# Patient Record
Sex: FEMALE | Race: OTHER | Employment: OTHER | URBAN - METROPOLITAN AREA
[De-identification: names, ages, dates, MRNs, and addresses within clinical notes are randomized per-mention and may not be internally consistent; named-entity substitution may affect disease eponyms.]

---

## 2022-02-04 ENCOUNTER — TRANSCRIBE ORDER (OUTPATIENT)
Dept: SCHEDULING | Age: 87
End: 2022-02-04

## 2022-02-04 DIAGNOSIS — N81.4 UTERINE PROLAPSE: Primary | ICD-10-CM

## 2022-02-11 ENCOUNTER — HOSPITAL ENCOUNTER (OUTPATIENT)
Dept: ULTRASOUND IMAGING | Age: 87
Discharge: HOME | End: 2022-02-11
Attending: FAMILY MEDICINE
Payer: MEDICARE

## 2022-02-11 DIAGNOSIS — N81.4 UTERINE PROLAPSE: ICD-10-CM

## 2022-02-11 PROCEDURE — 76856 US EXAM PELVIC COMPLETE: CPT

## 2023-10-18 ENCOUNTER — APPOINTMENT (OUTPATIENT)
Facility: HOSPITAL | Age: 88
DRG: 175 | End: 2023-10-18
Attending: STUDENT IN AN ORGANIZED HEALTH CARE EDUCATION/TRAINING PROGRAM
Payer: MEDICARE

## 2023-10-18 ENCOUNTER — APPOINTMENT (OUTPATIENT)
Facility: HOSPITAL | Age: 88
DRG: 175 | End: 2023-10-18
Payer: MEDICARE

## 2023-10-18 ENCOUNTER — HOSPITAL ENCOUNTER (INPATIENT)
Facility: HOSPITAL | Age: 88
LOS: 6 days | Discharge: HOSPICE | DRG: 175 | End: 2023-10-24
Attending: STUDENT IN AN ORGANIZED HEALTH CARE EDUCATION/TRAINING PROGRAM | Admitting: STUDENT IN AN ORGANIZED HEALTH CARE EDUCATION/TRAINING PROGRAM
Payer: MEDICARE

## 2023-10-18 DIAGNOSIS — I82.4Y2 DEEP VEIN THROMBOSIS (DVT) OF PROXIMAL VEIN OF LEFT LOWER EXTREMITY, UNSPECIFIED CHRONICITY (HCC): ICD-10-CM

## 2023-10-18 DIAGNOSIS — Z51.5 HOSPICE CARE PATIENT: ICD-10-CM

## 2023-10-18 DIAGNOSIS — N81.10 BLADDER PROLAPSE, FEMALE, ACQUIRED: Primary | ICD-10-CM

## 2023-10-18 DIAGNOSIS — I26.93 SINGLE SUBSEGMENTAL PULMONARY EMBOLISM WITHOUT ACUTE COR PULMONALE (HCC): ICD-10-CM

## 2023-10-18 PROBLEM — I26.99 PULMONARY EMBOLISM ON RIGHT (HCC): Status: ACTIVE | Noted: 2023-10-18

## 2023-10-18 LAB
ALBUMIN SERPL-MCNC: 2.7 G/DL (ref 3.4–5)
ALBUMIN/GLOB SERPL: 0.5 (ref 0.8–1.7)
ALP SERPL-CCNC: 89 U/L (ref 45–117)
ALT SERPL-CCNC: 13 U/L (ref 13–56)
AMORPH CRY URNS QL MICRO: ABNORMAL
ANION GAP SERPL CALC-SCNC: 4 MMOL/L (ref 3–18)
APPEARANCE UR: ABNORMAL
APTT PPP: 28 SEC (ref 23–36.4)
AST SERPL-CCNC: 48 U/L (ref 10–38)
BACTERIA URNS QL MICRO: ABNORMAL /HPF
BASOPHILS # BLD: 0 K/UL (ref 0–0.1)
BASOPHILS NFR BLD: 0 % (ref 0–2)
BILIRUB SERPL-MCNC: 0.7 MG/DL (ref 0.2–1)
BILIRUB UR QL: NEGATIVE
BUN SERPL-MCNC: 14 MG/DL (ref 7–18)
BUN/CREAT SERPL: 17 (ref 12–20)
CALCIUM SERPL-MCNC: 9.4 MG/DL (ref 8.5–10.1)
CHLORIDE SERPL-SCNC: 102 MMOL/L (ref 100–111)
CK SERPL-CCNC: 1239 U/L (ref 26–192)
CO2 SERPL-SCNC: 33 MMOL/L (ref 21–32)
COLOR UR: ABNORMAL
CREAT SERPL-MCNC: 0.83 MG/DL (ref 0.6–1.3)
D DIMER PPP FEU-MCNC: 12.56 UG/ML(FEU)
DIFFERENTIAL METHOD BLD: ABNORMAL
EOSINOPHIL # BLD: 0 K/UL (ref 0–0.4)
EOSINOPHIL NFR BLD: 0 % (ref 0–5)
EPITH CASTS URNS QL MICRO: ABNORMAL /LPF (ref 0–5)
ERYTHROCYTE [DISTWIDTH] IN BLOOD BY AUTOMATED COUNT: 12.5 % (ref 11.6–14.5)
ERYTHROCYTE [DISTWIDTH] IN BLOOD BY AUTOMATED COUNT: 12.6 % (ref 11.6–14.5)
ETHANOL SERPL-MCNC: <3 MG/DL (ref 0–3)
FLUAV RNA SPEC QL NAA+PROBE: NOT DETECTED
FLUBV RNA SPEC QL NAA+PROBE: NOT DETECTED
GLOBULIN SER CALC-MCNC: 5.2 G/DL (ref 2–4)
GLUCOSE SERPL-MCNC: 97 MG/DL (ref 74–99)
GLUCOSE UR STRIP.AUTO-MCNC: NEGATIVE MG/DL
HCT VFR BLD AUTO: 33.5 % (ref 35–45)
HCT VFR BLD AUTO: 37.2 % (ref 35–45)
HGB BLD-MCNC: 10.5 G/DL (ref 12–16)
HGB BLD-MCNC: 11.7 G/DL (ref 12–16)
HGB UR QL STRIP: ABNORMAL
IMM GRANULOCYTES # BLD AUTO: 0 K/UL (ref 0–0.04)
IMM GRANULOCYTES NFR BLD AUTO: 0 % (ref 0–0.5)
INR PPP: 1.1 (ref 0.9–1.1)
KETONES UR QL STRIP.AUTO: ABNORMAL MG/DL
LACTATE SERPL-SCNC: 1.4 MMOL/L (ref 0.4–2)
LACTATE SERPL-SCNC: 1.8 MMOL/L (ref 0.4–2)
LEUKOCYTE ESTERASE UR QL STRIP.AUTO: ABNORMAL
LYMPHOCYTES # BLD: 0.8 K/UL (ref 0.9–3.6)
LYMPHOCYTES NFR BLD: 10 % (ref 21–52)
MAGNESIUM SERPL-MCNC: 2.3 MG/DL (ref 1.6–2.6)
MCH RBC QN AUTO: 28.4 PG (ref 24–34)
MCH RBC QN AUTO: 28.4 PG (ref 24–34)
MCHC RBC AUTO-ENTMCNC: 31.3 G/DL (ref 31–37)
MCHC RBC AUTO-ENTMCNC: 31.5 G/DL (ref 31–37)
MCV RBC AUTO: 90.3 FL (ref 78–100)
MCV RBC AUTO: 90.5 FL (ref 78–100)
MONOCYTES # BLD: 0.7 K/UL (ref 0.05–1.2)
MONOCYTES NFR BLD: 9 % (ref 3–10)
NEUTS SEG # BLD: 6.2 K/UL (ref 1.8–8)
NEUTS SEG NFR BLD: 80 % (ref 40–73)
NITRITE UR QL STRIP.AUTO: NEGATIVE
NRBC # BLD: 0 K/UL (ref 0–0.01)
NRBC # BLD: 0 K/UL (ref 0–0.01)
NRBC BLD-RTO: 0 PER 100 WBC
NRBC BLD-RTO: 0 PER 100 WBC
PH UR STRIP: 6 (ref 5–8)
PLATELET # BLD AUTO: 267 K/UL (ref 135–420)
PLATELET # BLD AUTO: 297 K/UL (ref 135–420)
PMV BLD AUTO: 9.8 FL (ref 9.2–11.8)
PMV BLD AUTO: 9.8 FL (ref 9.2–11.8)
POTASSIUM SERPL-SCNC: 3.1 MMOL/L (ref 3.5–5.5)
PROT SERPL-MCNC: 7.9 G/DL (ref 6.4–8.2)
PROT UR STRIP-MCNC: 100 MG/DL
PROTHROMBIN TIME: 14 SEC (ref 11.9–14.7)
RBC # BLD AUTO: 3.7 M/UL (ref 4.2–5.3)
RBC # BLD AUTO: 4.12 M/UL (ref 4.2–5.3)
RBC #/AREA URNS HPF: ABNORMAL /HPF (ref 0–5)
SARS-COV-2 RNA RESP QL NAA+PROBE: NOT DETECTED
SODIUM SERPL-SCNC: 139 MMOL/L (ref 136–145)
SP GR UR REFRACTOMETRY: 1.02 (ref 1–1.03)
TROPONIN I SERPL HS-MCNC: 129 NG/L (ref 0–54)
TROPONIN I SERPL HS-MCNC: 133 NG/L (ref 0–54)
TSH SERPL DL<=0.05 MIU/L-ACNC: 0.67 UIU/ML (ref 0.36–3.74)
UROBILINOGEN UR QL STRIP.AUTO: 1 EU/DL (ref 0.2–1)
WBC # BLD AUTO: 6.6 K/UL (ref 4.6–13.2)
WBC # BLD AUTO: 7.7 K/UL (ref 4.6–13.2)
WBC URNS QL MICRO: ABNORMAL /HPF (ref 0–4)

## 2023-10-18 PROCEDURE — 2580000003 HC RX 258: Performed by: STUDENT IN AN ORGANIZED HEALTH CARE EDUCATION/TRAINING PROGRAM

## 2023-10-18 PROCEDURE — 74177 CT ABD & PELVIS W/CONTRAST: CPT

## 2023-10-18 PROCEDURE — 87150 DNA/RNA AMPLIFIED PROBE: CPT

## 2023-10-18 PROCEDURE — 83735 ASSAY OF MAGNESIUM: CPT

## 2023-10-18 PROCEDURE — 99223 1ST HOSP IP/OBS HIGH 75: CPT | Performed by: STUDENT IN AN ORGANIZED HEALTH CARE EDUCATION/TRAINING PROGRAM

## 2023-10-18 PROCEDURE — 1100000000 HC RM PRIVATE

## 2023-10-18 PROCEDURE — 71045 X-RAY EXAM CHEST 1 VIEW: CPT

## 2023-10-18 PROCEDURE — 85379 FIBRIN DEGRADATION QUANT: CPT

## 2023-10-18 PROCEDURE — 93971 EXTREMITY STUDY: CPT

## 2023-10-18 PROCEDURE — 6360000002 HC RX W HCPCS: Performed by: STUDENT IN AN ORGANIZED HEALTH CARE EDUCATION/TRAINING PROGRAM

## 2023-10-18 PROCEDURE — 82550 ASSAY OF CK (CPK): CPT

## 2023-10-18 PROCEDURE — 84443 ASSAY THYROID STIM HORMONE: CPT

## 2023-10-18 PROCEDURE — 87040 BLOOD CULTURE FOR BACTERIA: CPT

## 2023-10-18 PROCEDURE — 93005 ELECTROCARDIOGRAM TRACING: CPT | Performed by: STUDENT IN AN ORGANIZED HEALTH CARE EDUCATION/TRAINING PROGRAM

## 2023-10-18 PROCEDURE — 85025 COMPLETE CBC W/AUTO DIFF WBC: CPT

## 2023-10-18 PROCEDURE — 82077 ASSAY SPEC XCP UR&BREATH IA: CPT

## 2023-10-18 PROCEDURE — 84484 ASSAY OF TROPONIN QUANT: CPT

## 2023-10-18 PROCEDURE — 81001 URINALYSIS AUTO W/SCOPE: CPT

## 2023-10-18 PROCEDURE — 85610 PROTHROMBIN TIME: CPT

## 2023-10-18 PROCEDURE — 83605 ASSAY OF LACTIC ACID: CPT

## 2023-10-18 PROCEDURE — 6360000004 HC RX CONTRAST MEDICATION: Performed by: STUDENT IN AN ORGANIZED HEALTH CARE EDUCATION/TRAINING PROGRAM

## 2023-10-18 PROCEDURE — 6370000000 HC RX 637 (ALT 250 FOR IP): Performed by: STUDENT IN AN ORGANIZED HEALTH CARE EDUCATION/TRAINING PROGRAM

## 2023-10-18 PROCEDURE — 70450 CT HEAD/BRAIN W/O DYE: CPT

## 2023-10-18 PROCEDURE — 99285 EMERGENCY DEPT VISIT HI MDM: CPT

## 2023-10-18 PROCEDURE — 85027 COMPLETE CBC AUTOMATED: CPT

## 2023-10-18 PROCEDURE — 87636 SARSCOV2 & INF A&B AMP PRB: CPT

## 2023-10-18 PROCEDURE — 71275 CT ANGIOGRAPHY CHEST: CPT

## 2023-10-18 PROCEDURE — 85730 THROMBOPLASTIN TIME PARTIAL: CPT

## 2023-10-18 PROCEDURE — 80053 COMPREHEN METABOLIC PANEL: CPT

## 2023-10-18 RX ORDER — HEPARIN SODIUM 1000 [USP'U]/ML
40 INJECTION, SOLUTION INTRAVENOUS; SUBCUTANEOUS PRN
Status: DISCONTINUED | OUTPATIENT
Start: 2023-10-18 | End: 2023-10-21 | Stop reason: ALTCHOICE

## 2023-10-18 RX ORDER — ONDANSETRON 4 MG/1
4 TABLET, ORALLY DISINTEGRATING ORAL EVERY 8 HOURS PRN
Status: DISCONTINUED | OUTPATIENT
Start: 2023-10-18 | End: 2023-10-24 | Stop reason: HOSPADM

## 2023-10-18 RX ORDER — HEPARIN SODIUM 1000 [USP'U]/ML
80 INJECTION, SOLUTION INTRAVENOUS; SUBCUTANEOUS PRN
Status: DISCONTINUED | OUTPATIENT
Start: 2023-10-18 | End: 2023-10-21 | Stop reason: ALTCHOICE

## 2023-10-18 RX ORDER — SODIUM CHLORIDE 0.9 % (FLUSH) 0.9 %
5-40 SYRINGE (ML) INJECTION PRN
Status: DISCONTINUED | OUTPATIENT
Start: 2023-10-18 | End: 2023-10-24 | Stop reason: HOSPADM

## 2023-10-18 RX ORDER — POLYETHYLENE GLYCOL 3350 17 G/17G
17 POWDER, FOR SOLUTION ORAL DAILY PRN
Status: DISCONTINUED | OUTPATIENT
Start: 2023-10-18 | End: 2023-10-24 | Stop reason: HOSPADM

## 2023-10-18 RX ORDER — ONDANSETRON 2 MG/ML
4 INJECTION INTRAMUSCULAR; INTRAVENOUS EVERY 6 HOURS PRN
Status: DISCONTINUED | OUTPATIENT
Start: 2023-10-18 | End: 2023-10-24 | Stop reason: HOSPADM

## 2023-10-18 RX ORDER — ACETAMINOPHEN 650 MG/1
650 SUPPOSITORY RECTAL EVERY 6 HOURS PRN
Status: DISCONTINUED | OUTPATIENT
Start: 2023-10-18 | End: 2023-10-24 | Stop reason: HOSPADM

## 2023-10-18 RX ORDER — HEPARIN SODIUM 1000 [USP'U]/ML
80 INJECTION, SOLUTION INTRAVENOUS; SUBCUTANEOUS ONCE
Status: COMPLETED | OUTPATIENT
Start: 2023-10-18 | End: 2023-10-18

## 2023-10-18 RX ORDER — SODIUM CHLORIDE, SODIUM LACTATE, POTASSIUM CHLORIDE, CALCIUM CHLORIDE 600; 310; 30; 20 MG/100ML; MG/100ML; MG/100ML; MG/100ML
INJECTION, SOLUTION INTRAVENOUS CONTINUOUS
Status: DISCONTINUED | OUTPATIENT
Start: 2023-10-18 | End: 2023-10-20

## 2023-10-18 RX ORDER — SODIUM CHLORIDE 9 MG/ML
INJECTION, SOLUTION INTRAVENOUS PRN
Status: DISCONTINUED | OUTPATIENT
Start: 2023-10-18 | End: 2023-10-24 | Stop reason: HOSPADM

## 2023-10-18 RX ORDER — 0.9 % SODIUM CHLORIDE 0.9 %
500 INTRAVENOUS SOLUTION INTRAVENOUS ONCE
Status: COMPLETED | OUTPATIENT
Start: 2023-10-18 | End: 2023-10-18

## 2023-10-18 RX ORDER — SODIUM CHLORIDE 0.9 % (FLUSH) 0.9 %
5-40 SYRINGE (ML) INJECTION EVERY 12 HOURS SCHEDULED
Status: DISCONTINUED | OUTPATIENT
Start: 2023-10-18 | End: 2023-10-24 | Stop reason: HOSPADM

## 2023-10-18 RX ORDER — ACETAMINOPHEN 325 MG/1
650 TABLET ORAL EVERY 6 HOURS PRN
Status: DISCONTINUED | OUTPATIENT
Start: 2023-10-18 | End: 2023-10-24 | Stop reason: HOSPADM

## 2023-10-18 RX ORDER — POTASSIUM CHLORIDE 20 MEQ/1
40 TABLET, EXTENDED RELEASE ORAL
Status: COMPLETED | OUTPATIENT
Start: 2023-10-18 | End: 2023-10-18

## 2023-10-18 RX ORDER — HEPARIN SODIUM 10000 [USP'U]/100ML
5-30 INJECTION, SOLUTION INTRAVENOUS CONTINUOUS
Status: DISCONTINUED | OUTPATIENT
Start: 2023-10-18 | End: 2023-10-21

## 2023-10-18 RX ADMIN — HEPARIN SODIUM 2530 UNITS: 1000 INJECTION INTRAVENOUS; SUBCUTANEOUS at 20:17

## 2023-10-18 RX ADMIN — SODIUM CHLORIDE, PRESERVATIVE FREE 10 ML: 5 INJECTION INTRAVENOUS at 22:28

## 2023-10-18 RX ADMIN — SODIUM CHLORIDE 500 ML: 9 INJECTION, SOLUTION INTRAVENOUS at 15:11

## 2023-10-18 RX ADMIN — POTASSIUM CHLORIDE 40 MEQ: 1500 TABLET, EXTENDED RELEASE ORAL at 18:15

## 2023-10-18 RX ADMIN — IOPAMIDOL 80 ML: 755 INJECTION, SOLUTION INTRAVENOUS at 17:57

## 2023-10-18 RX ADMIN — SODIUM CHLORIDE, SODIUM LACTATE, POTASSIUM CHLORIDE, AND CALCIUM CHLORIDE: 600; 310; 30; 20 INJECTION, SOLUTION INTRAVENOUS at 22:40

## 2023-10-18 RX ADMIN — SODIUM CHLORIDE 500 ML: 9 INJECTION, SOLUTION INTRAVENOUS at 17:37

## 2023-10-18 RX ADMIN — WATER 1000 MG: 1 INJECTION INTRAMUSCULAR; INTRAVENOUS; SUBCUTANEOUS at 20:17

## 2023-10-18 RX ADMIN — HEPARIN SODIUM 18 UNITS/KG/HR: 10000 INJECTION, SOLUTION INTRAVENOUS at 20:18

## 2023-10-18 ASSESSMENT — PAIN - FUNCTIONAL ASSESSMENT: PAIN_FUNCTIONAL_ASSESSMENT: NONE - DENIES PAIN

## 2023-10-18 NOTE — ED NOTES
Pt back from CT scan, Medicated per [facility], awaiting further orders.       Stefanie Cee RN  10/18/23 1680

## 2023-10-18 NOTE — ED PROVIDER NOTES
unclear significance as well as potassium at 3.1 which would be replaced orally. Lactic acid, magnesium, TSH, alcohol, CBC, INR all within normal limits. D-dimer is markedly elevated at 12.56. Because this we will add on a CTA of the chest in addition to duplexes of her left upper and lower extremity. Troponin is elevated and will be trended. CPK is elevated concerning for rhabdo and she will be given fluids. Flu and COVID are negative. UA shows moderate blood with minimal red blood cells concerning for rhabdo and a white blood cell of 15-20 with some bacteria. Although it is a contaminated sample she will be treated for UTI with ceftriaxone. Also given fluids for rhabdo. CPK is elevated    Duplexes do show acute DVTs with some occlusion however has collateral flow. Patient has good pulses in her feet and no significant pain at this time therefore no emergent intervention required at this time. CT is concerning for a segmental PE and possible malignancy. CT of her head unremarkable. CT of the abdomen shows moderate atherosclerosis but no other significant acute findings at this time. Discussed findings with the patient. She really wants to go home I discussed with her why this is unsafe and therefore she will get admitted to the hospital.    Patient is admitted to the hospitalist service. Patient is in agreement with the plan to be admitted at this time. All orders moving forward replacement the admitting team.             CRITICAL CARE TIME   Total Critical Care time was 35 minutes, excluding separately reportable procedures. There was a high probability of clinically significant/life threatening deterioration in the patient's condition which required my urgent intervention. CONSULTS:  None    PROCEDURES:  Unless otherwise noted below, none     Procedures      FINAL IMPRESSION      1. Bladder prolapse, female, acquired    2.  Single subsegmental pulmonary embolism without acute cor

## 2023-10-18 NOTE — ED NOTES
Pt cleaned of stool. Noted to have a very large prolapsed uterus. Areas very dry to with necrotic area to underside. Dr. Brenden Brownlee at bedside to assess, area cleaned, NS soaked gauze over site, straight cath placed no urine noted. Secured to see if UA can be obtained. Bilateral PIV 's started blood cultures and lactic acid drawn. Covid swab sent. Pt covered with several blankets.        Marjorie Anna RN  10/18/23 6645

## 2023-10-18 NOTE — ED NOTES
Pt transported to CT scan with transport staff.  No new order noted, No heparin gtt started at this time, will clarify with MD. Pt resting comfortable on stretcher,        Brad Varghese RN  10/18/23 1498

## 2023-10-18 NOTE — ED NOTES
EKG, vascular studies and repeat labs done. MD at bedside to explain to POC. VS stable.       Kayy Toribio RN  10/18/23 0334 Per  data, pt last had oxycodone 5mg tablets refilled for a 7 day supply on 10/13/22. Medication is due for refill on 10/20/22. Pt was last seen by provider on 12/5/22. Medication routed to provider for refill approval.     WES SALCEDO RN on 1/13/2023 at 8:19 AM

## 2023-10-18 NOTE — ED TRIAGE NOTES
Assumed care of pt in rm 1. Pt here for generalized weakness and \"leaning to the left for 2 weeks. \" Pt is A O x 4, lung sounds diminished, pt is very fragile, malnourished, bones protruding, skin dry. Pt also covered in stool. Placed on full cardiac monitor.

## 2023-10-19 ENCOUNTER — ANESTHESIA EVENT (OUTPATIENT)
Dept: CARDIOTHORACIC SURGERY | Facility: HOSPITAL | Age: 88
End: 2023-10-19
Payer: MEDICARE

## 2023-10-19 ENCOUNTER — ANESTHESIA (OUTPATIENT)
Dept: CARDIOTHORACIC SURGERY | Facility: HOSPITAL | Age: 88
End: 2023-10-19
Payer: MEDICARE

## 2023-10-19 ENCOUNTER — APPOINTMENT (OUTPATIENT)
Facility: HOSPITAL | Age: 88
DRG: 175 | End: 2023-10-19
Payer: MEDICARE

## 2023-10-19 PROBLEM — E43 SEVERE MALNUTRITION (HCC): Chronic | Status: ACTIVE | Noted: 2023-10-19

## 2023-10-19 LAB
ACCESSION NUMBER, LLC1M: NORMAL
ACINETOBACTER CALCOAC BAUMANNII COMPLEX BY PCR: NOT DETECTED
ANION GAP SERPL CALC-SCNC: 5 MMOL/L (ref 3–18)
APTT PPP: 100.3 SEC (ref 23–36.4)
APTT PPP: 52 SEC (ref 23–36.4)
B FRAGILIS DNA BLD POS QL NAA+NON-PROBE: NOT DETECTED
BASOPHILS # BLD: 0 K/UL (ref 0–0.1)
BASOPHILS NFR BLD: 0 % (ref 0–2)
BIOFIRE TEST COMMENT: NORMAL
BUN SERPL-MCNC: 11 MG/DL (ref 7–18)
BUN/CREAT SERPL: 19 (ref 12–20)
C ALBICANS DNA BLD POS QL NAA+NON-PROBE: NOT DETECTED
C AURIS DNA BLD POS QL NAA+NON-PROBE: NOT DETECTED
C GATTII+NEOFOR DNA BLD POS QL NAA+N-PRB: NOT DETECTED
C GLABRATA DNA BLD POS QL NAA+NON-PROBE: NOT DETECTED
C KRUSEI DNA BLD POS QL NAA+NON-PROBE: NOT DETECTED
C PARAP DNA BLD POS QL NAA+NON-PROBE: NOT DETECTED
C TROPICLS DNA BLD POS QL NAA+NON-PROBE: NOT DETECTED
CALCIUM SERPL-MCNC: 8.7 MG/DL (ref 8.5–10.1)
CHLORIDE SERPL-SCNC: 107 MMOL/L (ref 100–111)
CO2 SERPL-SCNC: 29 MMOL/L (ref 21–32)
CREAT SERPL-MCNC: 0.58 MG/DL (ref 0.6–1.3)
DIFFERENTIAL METHOD BLD: ABNORMAL
E CLOAC COMP DNA BLD POS NAA+NON-PROBE: NOT DETECTED
E COLI DNA BLD POS QL NAA+NON-PROBE: NOT DETECTED
E FAECALIS DNA BLD POS QL NAA+NON-PROBE: NOT DETECTED
E FAECIUM DNA BLD POS QL NAA+NON-PROBE: NOT DETECTED
ECHO BSA: 1.19 M2
ECHO BSA: 1.19 M2
EKG ATRIAL RATE: 288 BPM
EKG DIAGNOSIS: NORMAL
EKG P AXIS: 63 DEGREES
EKG P-R INTERVAL: 154 MS
EKG Q-T INTERVAL: 468 MS
EKG QRS DURATION: 84 MS
EKG QTC CALCULATION (BAZETT): 475 MS
EKG R AXIS: 51 DEGREES
EKG T AXIS: 242 DEGREES
EKG VENTRICULAR RATE: 62 BPM
ENTEROBACTERALES DNA BLD POS NAA+N-PRB: NOT DETECTED
EOSINOPHIL # BLD: 0 K/UL (ref 0–0.4)
EOSINOPHIL NFR BLD: 0 % (ref 0–5)
ERYTHROCYTE [DISTWIDTH] IN BLOOD BY AUTOMATED COUNT: 12.6 % (ref 11.6–14.5)
GLUCOSE SERPL-MCNC: 79 MG/DL (ref 74–99)
GP B STREP DNA BLD POS QL NAA+NON-PROBE: NOT DETECTED
HAEM INFLU DNA BLD POS QL NAA+NON-PROBE: NOT DETECTED
HCT VFR BLD AUTO: 32.5 % (ref 35–45)
HGB BLD-MCNC: 10 G/DL (ref 12–16)
IMM GRANULOCYTES # BLD AUTO: 0 K/UL (ref 0–0.04)
IMM GRANULOCYTES NFR BLD AUTO: 0 % (ref 0–0.5)
K OXYTOCA DNA BLD POS QL NAA+NON-PROBE: NOT DETECTED
KLEBSIELLA SP DNA BLD POS QL NAA+NON-PRB: NOT DETECTED
KLEBSIELLA SP DNA BLD POS QL NAA+NON-PRB: NOT DETECTED
L MONOCYTOG DNA BLD POS QL NAA+NON-PROBE: NOT DETECTED
LYMPHOCYTES # BLD: 0.9 K/UL (ref 0.9–3.6)
LYMPHOCYTES NFR BLD: 17 % (ref 21–52)
MAGNESIUM SERPL-MCNC: 2.1 MG/DL (ref 1.6–2.6)
MCH RBC QN AUTO: 27.9 PG (ref 24–34)
MCHC RBC AUTO-ENTMCNC: 30.8 G/DL (ref 31–37)
MCV RBC AUTO: 90.5 FL (ref 78–100)
MONOCYTES # BLD: 0.5 K/UL (ref 0.05–1.2)
MONOCYTES NFR BLD: 9 % (ref 3–10)
N MEN DNA BLD POS QL NAA+NON-PROBE: NOT DETECTED
NEUTS SEG # BLD: 3.8 K/UL (ref 1.8–8)
NEUTS SEG NFR BLD: 73 % (ref 40–73)
NRBC # BLD: 0 K/UL (ref 0–0.01)
NRBC BLD-RTO: 0 PER 100 WBC
P AERUGINOSA DNA BLD POS NAA+NON-PROBE: NOT DETECTED
PLATELET # BLD AUTO: 276 K/UL (ref 135–420)
PMV BLD AUTO: 9.9 FL (ref 9.2–11.8)
POTASSIUM SERPL-SCNC: 3.2 MMOL/L (ref 3.5–5.5)
PROTEUS SP DNA BLD POS QL NAA+NON-PROBE: NOT DETECTED
RBC # BLD AUTO: 3.59 M/UL (ref 4.2–5.3)
RESISTANT GENE TARGETS: NORMAL
S AUREUS DNA BLD POS QL NAA+NON-PROBE: NOT DETECTED
S AUREUS+CONS DNA BLD POS NAA+NON-PROBE: NOT DETECTED
S EPIDERMIDIS DNA BLD POS QL NAA+NON-PRB: NOT DETECTED
S LUGDUNENSIS DNA BLD POS QL NAA+NON-PRB: NOT DETECTED
S MALTOPHILIA DNA BLD POS QL NAA+NON-PRB: NOT DETECTED
S MARCESCENS DNA BLD POS NAA+NON-PROBE: NOT DETECTED
S PNEUM DNA BLD POS QL NAA+NON-PROBE: NOT DETECTED
S PYO DNA BLD POS QL NAA+NON-PROBE: NOT DETECTED
SALMONELLA DNA BLD POS QL NAA+NON-PROBE: NOT DETECTED
SODIUM SERPL-SCNC: 141 MMOL/L (ref 136–145)
STREPTOCOCCUS DNA BLD POS NAA+NON-PROBE: NOT DETECTED
TROPONIN I SERPL HS-MCNC: 149 NG/L (ref 0–54)
WBC # BLD AUTO: 5.2 K/UL (ref 4.6–13.2)

## 2023-10-19 PROCEDURE — 1100000003 HC PRIVATE W/ TELEMETRY

## 2023-10-19 PROCEDURE — 6360000002 HC RX W HCPCS: Performed by: STUDENT IN AN ORGANIZED HEALTH CARE EDUCATION/TRAINING PROGRAM

## 2023-10-19 PROCEDURE — 93971 EXTREMITY STUDY: CPT | Performed by: SURGERY

## 2023-10-19 PROCEDURE — 3700000000 HC ANESTHESIA ATTENDED CARE: Performed by: SURGERY

## 2023-10-19 PROCEDURE — 99232 SBSQ HOSP IP/OBS MODERATE 35: CPT | Performed by: STUDENT IN AN ORGANIZED HEALTH CARE EDUCATION/TRAINING PROGRAM

## 2023-10-19 PROCEDURE — 6360000004 HC RX CONTRAST MEDICATION: Performed by: SURGERY

## 2023-10-19 PROCEDURE — 2580000003 HC RX 258: Performed by: STUDENT IN AN ORGANIZED HEALTH CARE EDUCATION/TRAINING PROGRAM

## 2023-10-19 PROCEDURE — C1769 GUIDE WIRE: HCPCS | Performed by: SURGERY

## 2023-10-19 PROCEDURE — 93010 ELECTROCARDIOGRAM REPORT: CPT | Performed by: INTERNAL MEDICINE

## 2023-10-19 PROCEDURE — 85730 THROMBOPLASTIN TIME PARTIAL: CPT

## 2023-10-19 PROCEDURE — 80048 BASIC METABOLIC PNL TOTAL CA: CPT

## 2023-10-19 PROCEDURE — 84484 ASSAY OF TROPONIN QUANT: CPT

## 2023-10-19 PROCEDURE — 6370000000 HC RX 637 (ALT 250 FOR IP): Performed by: STUDENT IN AN ORGANIZED HEALTH CARE EDUCATION/TRAINING PROGRAM

## 2023-10-19 PROCEDURE — C1894 INTRO/SHEATH, NON-LASER: HCPCS | Performed by: SURGERY

## 2023-10-19 PROCEDURE — 06H03DZ INSERTION OF INTRALUMINAL DEVICE INTO INFERIOR VENA CAVA, PERCUTANEOUS APPROACH: ICD-10-PCS | Performed by: SURGERY

## 2023-10-19 PROCEDURE — 3700000001 HC ADD 15 MINUTES (ANESTHESIA): Performed by: SURGERY

## 2023-10-19 PROCEDURE — B5191ZZ FLUOROSCOPY OF INFERIOR VENA CAVA USING LOW OSMOLAR CONTRAST: ICD-10-PCS | Performed by: SURGERY

## 2023-10-19 PROCEDURE — C1880 VENA CAVA FILTER: HCPCS | Performed by: SURGERY

## 2023-10-19 PROCEDURE — 3600000002 HC SURGERY LEVEL 2 BASE: Performed by: SURGERY

## 2023-10-19 PROCEDURE — 3600000012 HC SURGERY LEVEL 2 ADDTL 15MIN: Performed by: SURGERY

## 2023-10-19 PROCEDURE — 85025 COMPLETE CBC W/AUTO DIFF WBC: CPT

## 2023-10-19 PROCEDURE — 83735 ASSAY OF MAGNESIUM: CPT

## 2023-10-19 PROCEDURE — 2500000003 HC RX 250 WO HCPCS: Performed by: SURGERY

## 2023-10-19 PROCEDURE — 36415 COLL VENOUS BLD VENIPUNCTURE: CPT

## 2023-10-19 PROCEDURE — 2709999900 HC NON-CHARGEABLE SUPPLY: Performed by: SURGERY

## 2023-10-19 PROCEDURE — 94761 N-INVAS EAR/PLS OXIMETRY MLT: CPT

## 2023-10-19 DEVICE — FILTER VASC L49MM DIA30MM INTRO 7FR L65CM CATH L79CM VENA: Type: IMPLANTABLE DEVICE | Status: FUNCTIONAL

## 2023-10-19 RX ORDER — LIDOCAINE HYDROCHLORIDE 10 MG/ML
INJECTION, SOLUTION EPIDURAL; INFILTRATION; INTRACAUDAL; PERINEURAL PRN
Status: DISCONTINUED | OUTPATIENT
Start: 2023-10-19 | End: 2023-10-19 | Stop reason: ALTCHOICE

## 2023-10-19 RX ORDER — IODIXANOL 320 MG/ML
INJECTION, SOLUTION INTRAVASCULAR PRN
Status: DISCONTINUED | OUTPATIENT
Start: 2023-10-19 | End: 2023-10-19 | Stop reason: ALTCHOICE

## 2023-10-19 RX ORDER — HEPARIN SODIUM 200 [USP'U]/100ML
INJECTION, SOLUTION INTRAVENOUS
Status: DISPENSED
Start: 2023-10-19 | End: 2023-10-20

## 2023-10-19 RX ORDER — POTASSIUM CHLORIDE 20 MEQ/1
40 TABLET, EXTENDED RELEASE ORAL 2 TIMES DAILY WITH MEALS
Status: DISCONTINUED | OUTPATIENT
Start: 2023-10-19 | End: 2023-10-19

## 2023-10-19 RX ORDER — GAUZE BANDAGE 2" X 2"
100 BANDAGE TOPICAL DAILY
Status: DISCONTINUED | OUTPATIENT
Start: 2023-10-19 | End: 2023-10-24 | Stop reason: HOSPADM

## 2023-10-19 RX ORDER — POTASSIUM CHLORIDE 7.45 MG/ML
10 INJECTION INTRAVENOUS
Status: COMPLETED | OUTPATIENT
Start: 2023-10-19 | End: 2023-10-19

## 2023-10-19 RX ORDER — MULTIVITAMIN WITH IRON
1 TABLET ORAL DAILY
Status: DISCONTINUED | OUTPATIENT
Start: 2023-10-19 | End: 2023-10-24 | Stop reason: HOSPADM

## 2023-10-19 RX ORDER — IODIXANOL 320 MG/ML
INJECTION, SOLUTION INTRAVASCULAR
Status: DISPENSED
Start: 2023-10-19 | End: 2023-10-20

## 2023-10-19 RX ORDER — PETROLATUM,WHITE/LANOLIN
OINTMENT (GRAM) TOPICAL 2 TIMES DAILY
Status: DISCONTINUED | OUTPATIENT
Start: 2023-10-19 | End: 2023-10-24 | Stop reason: HOSPADM

## 2023-10-19 RX ADMIN — Medication: at 21:05

## 2023-10-19 RX ADMIN — POTASSIUM BICARBONATE 25 MEQ: 978 TABLET, EFFERVESCENT ORAL at 21:04

## 2023-10-19 RX ADMIN — SODIUM CHLORIDE: 9 INJECTION, SOLUTION INTRAVENOUS at 15:31

## 2023-10-19 RX ADMIN — POTASSIUM CHLORIDE 10 MEQ: 7.45 INJECTION INTRAVENOUS at 04:45

## 2023-10-19 RX ADMIN — SODIUM CHLORIDE, PRESERVATIVE FREE 10 ML: 5 INJECTION INTRAVENOUS at 21:05

## 2023-10-19 RX ADMIN — WATER 1000 MG: 1 INJECTION INTRAMUSCULAR; INTRAVENOUS; SUBCUTANEOUS at 17:33

## 2023-10-19 RX ADMIN — POTASSIUM BICARBONATE 25 MEQ: 978 TABLET, EFFERVESCENT ORAL at 17:30

## 2023-10-19 RX ADMIN — Medication: at 13:54

## 2023-10-19 RX ADMIN — POTASSIUM CHLORIDE 10 MEQ: 7.45 INJECTION INTRAVENOUS at 08:00

## 2023-10-19 RX ADMIN — POTASSIUM CHLORIDE 10 MEQ: 7.45 INJECTION INTRAVENOUS at 05:51

## 2023-10-19 RX ADMIN — SODIUM CHLORIDE, PRESERVATIVE FREE 10 ML: 5 INJECTION INTRAVENOUS at 08:26

## 2023-10-19 RX ADMIN — HEPARIN SODIUM 2530 UNITS: 1000 INJECTION INTRAVENOUS; SUBCUTANEOUS at 03:50

## 2023-10-19 RX ADMIN — Medication 100 MG: at 12:53

## 2023-10-19 RX ADMIN — THERA TABS 1 TABLET: TAB at 12:53

## 2023-10-19 ASSESSMENT — ENCOUNTER SYMPTOMS: SHORTNESS OF BREATH: 1

## 2023-10-19 NOTE — CONSULTS
Comprehensive Nutrition Assessment    Type and Reason for Visit:  Initial, Positive Nutrition Screen, Consult    Nutrition Recommendations/Plan:   Continue Current Diet. Modify Oral Nutrition Supplements- Plan to add Marco (each provides 95 kcal, 2.5g protein) BID to assist with wound healing, Continue Ensure TID. Plan to order Multivitamin and Thiamine d/t Malnutrition. Continue to monitor tolerance of PO, compliance of oral supplements, weight, labs, and plan of care during admission. Malnutrition Assessment:  Malnutrition Status:  Severe malnutrition (10/19/23 1112)    Context:  Chronic Illness     Findings of the 6 clinical characteristics of malnutrition:  Energy Intake:  75% or less estimated energy requirements for 1 month or longer  Weight Loss:  No significant weight loss (Limited weight hx.)     Body Fat Loss:  Severe body fat loss Orbital, Buccal region   Muscle Mass Loss:  Severe muscle mass loss Temples (temporalis)  Fluid Accumulation:  No significant fluid accumulation     Strength:  Not Performed    Nutrition History and Allergies: PMHx: Uterine prolapse, cachexia, recent falls. Hx obtained from pt's daughter. Pt with poor appetite, consuming <50% of meals x 1 month d/t early satiety, prior to that time period pt with good appetite. Pt UBW ~110 lbs x 1 year and weight loss. Wt hx: 94 lbs (10/19/23) obtained by RD. Limited weight hx. Based on reported UBW, weight loss of 16 lbs (14.5%) x 1 year, non-significant weight loss. Will continue to monitor weight trends. NKFA per chart review and pt's daughter. Nutrition Assessment:    Pt presents with complaints of fatigue, swelling of left arm and leg. Pt admitted for pulmonary embolism on right. Positive nutrition screen noted, presure injury. Consult noted for wounds. Note, pt BMI 16. Attempted visit, pt sleeping did not wake to verbal stimuli. Per pt's daughter, pt consumed breakfast today. No PO documented in flowsheets.  Continue

## 2023-10-19 NOTE — CONSULTS
Room 2315: Focused wound assess    Left medial heel, black callous tissue, silicone heel dressing in place,   No drainage or odor, periwound tissue intact, POA. Right heel has no pressure injuries, flaky skin, silicone dressing in use, POA. Anterior prolapse of bladder, white yellow covering of tissue on mucosa,   (+) painful to touch, POA. Posterior aspect of prolapsed bladder, yellow tissue covering mucosa along with  Bright red tissue, small amount brown black tissue noted on posterior aspect,  (+) painful to touch, POA. Sacrum, stage 2 pressure injury, red base, white tissue on edges, 1x1x0.1cm,   Drainage scant serosanguinous  No wound Odor  Edges are defined & attached. Periwound intact. Dressing silicone heart in place. POA. Left hip, stage 2 pressure injury, 1x0.5x0.1cm, red base tissue, edges defined  & attached, scant serosanguinous drainage, no wound odor,   Periwound intact. Dressing silicone. POA. Right hip intact. Pericare provided for smear of light brown stool. Booster pad changed. Bed pad changed. Wound Care Orders Sacrum, heels, & left hip: Unit staff nurses to cleanse wound with wound spray from par room, then apply Optifoam Gentle Silicone dressing, change every 2 days & prn soilage. Wound care orders prolapsed bladder: Unit nursing staff to apply Vitamin A&D oint BID & prn soilage. Cover with Booster pad from par room. Heel prevention boots & turning orders placed. Request RD to evaluate. Will turn over care to unit nursing staff at this time & sign off.    Neo MCKEE, RN, [facility] [employer], [address] [place] [address]

## 2023-10-19 NOTE — ANESTHESIA PRE PROCEDURE
01:41 AM    RDW 12.6 10/19/2023 01:41 AM     10/19/2023 01:41 AM       CMP:   Lab Results   Component Value Date/Time     10/19/2023 01:41 AM    K 3.2 10/19/2023 01:41 AM     10/19/2023 01:41 AM    CO2 29 10/19/2023 01:41 AM    BUN 11 10/19/2023 01:41 AM    CREATININE 0.58 10/19/2023 01:41 AM    LABGLOM >60 10/19/2023 01:41 AM    GLUCOSE 79 10/19/2023 01:41 AM    PROT 7.9 10/18/2023 03:00 PM    CALCIUM 8.7 10/19/2023 01:41 AM    BILITOT 0.7 10/18/2023 03:00 PM    ALKPHOS 89 10/18/2023 03:00 PM    AST 48 10/18/2023 03:00 PM    ALT 13 10/18/2023 03:00 PM       POC Tests: No results for input(s): \"POCGLU\", \"POCNA\", \"POCK\", \"POCCL\", \"POCBUN\", \"POCHEMO\", \"POCHCT\" in the last 72 hours. Coags:   Lab Results   Component Value Date/Time    PROTIME 14.0 10/18/2023 03:00 PM    INR 1.1 10/18/2023 03:00 PM    APTT 100.3 10/19/2023 10:24 AM       HCG (If Applicable): No results found for: \"PREGTESTUR\", \"PREGSERUM\", \"HCG\", \"HCGQUANT\"     ABGs: No results found for: \"PHART\", \"PO2ART\", \"AQU6LQP\", \"TFP4YKM\", \"BEART\", \"I0FOPBNU\"     Type & Screen (If Applicable):  No results found for: \"LABABO\", \"LABRH\"    Drug/Infectious Status (If Applicable):  No results found for: \"HIV\", \"HEPCAB\"    COVID-19 Screening (If Applicable):   Lab Results   Component Value Date/Time    COVID19 Not detected 10/18/2023 03:00 PM           Anesthesia Evaluation  Patient summary reviewed no history of anesthetic complications:   Airway: Mallampati: Unable to assess / NA       Comment: Not able to assess     Dental:    (+) upper dentures and lower dentures      Pulmonary:   (+) shortness of breath:  decreased breath sounds                           ROS comment: PE   Cardiovascular:    (+) past MI:,         Rhythm: regular  Rate: abnormal                    Neuro/Psych:               GI/Hepatic/Renal:             Endo/Other:                      ROS comment: Rhabdomyolysis Abdominal:             Vascular:   + DVT, PE.        Other Findings:

## 2023-10-19 NOTE — OR NURSING
TRANSFER - OUT REPORT:    Verbal report given to Raina Sams  on Yunier Gutierrez  @16:07 being transferred to 2315 CVT step down  for routine post-op       Report consisted of patient's Situation, Background, Assessment and   Recommendations(SBAR). Information from the following report(s) Nurse Handoff Report and Procedure Verification was reviewed with the receiving nurse. Lines:   Peripheral IV 10/18/23 Left; Anterior Cephalic (Active)   Site Assessment Clean, dry & intact 10/19/23 0349   Line Status Infusing 10/19/23 0349   Line Care Connections checked and tightened 10/19/23 0349   Phlebitis Assessment No symptoms 10/19/23 0349   Infiltration Assessment 0 10/19/23 0349   Alcohol Cap Used Yes 10/19/23 0349   Dressing Status Clean, dry & intact 10/19/23 0349   Dressing Type Transparent 10/19/23 0349       Peripheral IV 10/18/23 Posterior;Right Forearm (Active)   Site Assessment Clean, dry & intact 10/19/23 0349   Line Status Infusing 10/19/23 [address] Connections checked and tightened 10/19/23 0349   Phlebitis Assessment No symptoms 10/19/23 0349   Infiltration Assessment 0 10/19/23 0349   Alcohol Cap Used Yes 10/19/23 0349   Dressing Status Clean, dry & intact 10/19/23 0349   Dressing Type Transparent 10/19/23 0349        Opportunity for questions and clarification was provided.       Patient transported with:  Monitor, O2 @ 2 lpm, and Registered Nurse and Anesthesiologist

## 2023-10-19 NOTE — CONSULTS
Kasie Wang    Chief Complaint   Patient presents with    Fatigue       History and Physical    40-year-old female brought to the hospital after having several falls the family noted swelling on the left arm and the left leg. In her work-up in the emergency room she was found to have significant DVT left lower extremity possibly left upper extremity and a CT chest showed pulmonary embolism. Patient is unable to provide her own history. Discussed with her daughter on the phone. No past medical history on file. Patient Active Problem List   Diagnosis    Pulmonary embolism on right [facility])     No past surgical history on file.   Current Facility-Administered Medications   Medication Dose Route Frequency Provider Last Rate Last Admin    vitamin A & D ointment   Topical BID Kristie Zhou,         thiamine mononitrate tablet 100 mg  100 mg Oral Daily Kristie Zhou, DO        multivitamin 1 tablet  1 tablet Oral Daily Kristie Zhou, DO        heparin (porcine) injection 2,530 Units  80 Units/kg IntraVENous PRN Abner Pastor MD   2,530 Units at 10/19/23 0350    heparin (porcine) injection 1,260 Units  40 Units/kg IntraVENous PRN Abner Pastor MD        [facility] [place] by provider] heparin 25,000 units in dextrose 5% 250 mL (premix) infusion  5-30 Units/kg/hr IntraVENous Continuous Abner Pastor MD 7 mL/hr at 10/19/23 0349 22 Units/kg/hr at 10/19/23 0349    sodium chloride flush 0.9 % injection 5-40 mL  5-40 mL IntraVENous 2 times per day Abner Pastor MD   10 mL at 10/19/23 0826    sodium chloride flush 0.9 % injection 5-40 mL  5-40 mL IntraVENous PRN Abner Pastor MD        0.9 % sodium chloride infusion   IntraVENous PRN Abner Pastor MD        ondansetron (ZOFRAN-ODT) disintegrating tablet 4 mg  4 mg Oral Q8H PRN Abner Pastor MD        Or    [facility]) injection 4 mg  4 mg IntraVENous Q6H PRN Abner Pastor MD        polyethylene glycol (GLYCOLAX) packet 17 g  17 g Oral Daily

## 2023-10-19 NOTE — OP NOTE
Operative Note      Patient: Gely Garcia  YOB: 1932  MRN: [id number]    Date of Procedure: 10/19/2023    Pre-Op Diagnosis Codes:     * Deep vein thrombosis (DVT) of proximal vein of left lower extremity, unspecified chronicity ([address]) [I82.4Y2]    Post-Op Diagnosis:  DVT left lower extremity, pulmonary embolism, unable to fully anticoagulate secondary to bleed       Procedures: Ultrasound of right femoral vein with interpretation  Inferior venacavogram with interpretation  Insertion of inferior vena cava filter    Surgeon(s):  Juwan Caruso MD    Assistant:   Surgical Assistant: Dixie Leyva    Anesthesia: Local    Estimated Blood Loss (mL): Minimal    Complications: None    Specimens:   * No specimens in log *    Implants:  * No implants in log *      Drains: * No LDAs found *    Findings: The right iliac vein is patent and free of thrombus. The inferior vena cava is tortuous and follows the path of her scoliosis. The inferior vena cava is patent and there is no thrombus present the renal veins are well visualized. Inferior vena cava between the renals and the bifurcation is 25 mm greatest diameter. Successful deployment of a Cook select platinum filter above the bifurcation below the renal veins with no migration is full deployment. Detailed Description of Procedure:   Right groin was prepped draped usual standard fashion followed by CC, guidelines trace of technique 1% lidocaine was used for local.  Ultrasound was done of the right femoral vein. It is free of thrombus and compressible. Under direct visualization a puncture to the 12:00 and placed a wire and then a 8 [place] sheath. Performed a venogram for findings as above. Placed the introduction of sheath into the inferior vena cava and deployed the Thresa Major select platinum filter in the appropriate position.   A picture was stored and the filter was fully deployed there is no migration of pulled the sheath and held direct pressure

## 2023-10-19 NOTE — SIGNIFICANT EVENT
RN notified of intermittent sinus bradycardia (HR in 30s) - patient asymptomatic and HD stable. Review of telemetry confirms sinus jesse. No reason for pacing pads or PRN atropine. Will CTM and obtain ECG if sustained for long enough to obtain.     Ed Jauregui MD  1:06 AM

## 2023-10-20 PROBLEM — N81.10 BLADDER PROLAPSE, FEMALE, ACQUIRED: Status: ACTIVE | Noted: 2023-10-20

## 2023-10-20 PROBLEM — R54 ADVANCED AGE: Status: ACTIVE | Noted: 2023-10-20

## 2023-10-20 PROBLEM — Z51.5 ENCOUNTER FOR PALLIATIVE CARE: Status: ACTIVE | Noted: 2023-10-20

## 2023-10-20 PROBLEM — I26.93 SINGLE SUBSEGMENTAL PULMONARY EMBOLISM WITHOUT ACUTE COR PULMONALE (HCC): Status: ACTIVE | Noted: 2023-10-20

## 2023-10-20 PROBLEM — R64 CACHEXIA (HCC): Status: ACTIVE | Noted: 2023-10-20
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
## 2023-10-20 PROBLEM — I82.4Y2 DEEP VEIN THROMBOSIS (DVT) OF PROXIMAL VEIN OF LEFT LOWER EXTREMITY (HCC): Status: ACTIVE | Noted: 2023-10-20

## 2023-10-20 PROBLEM — M62.82 NON-TRAUMATIC RHABDOMYOLYSIS: Status: ACTIVE | Noted: 2023-10-20

## 2023-10-20 PROBLEM — Z71.89 GOALS OF CARE, COUNSELING/DISCUSSION: Status: ACTIVE | Noted: 2023-10-20

## 2023-10-20 LAB
ANION GAP SERPL CALC-SCNC: 1 MMOL/L (ref 3–18)
APTT PPP: 108.2 SEC (ref 23–36.4)
APTT PPP: 44.7 SEC (ref 23–36.4)
APTT PPP: 81.2 SEC (ref 23–36.4)
BASOPHILS # BLD: 0 K/UL (ref 0–0.1)
BASOPHILS NFR BLD: 1 % (ref 0–2)
BUN SERPL-MCNC: 8 MG/DL (ref 7–18)
BUN/CREAT SERPL: 16 (ref 12–20)
CALCIUM SERPL-MCNC: 8.4 MG/DL (ref 8.5–10.1)
CHLORIDE SERPL-SCNC: 106 MMOL/L (ref 100–111)
CK SERPL-CCNC: 398 U/L (ref 26–192)
CO2 SERPL-SCNC: 31 MMOL/L (ref 21–32)
CREAT SERPL-MCNC: 0.49 MG/DL (ref 0.6–1.3)
DIFFERENTIAL METHOD BLD: ABNORMAL
EOSINOPHIL # BLD: 0.1 K/UL (ref 0–0.4)
EOSINOPHIL NFR BLD: 3 % (ref 0–5)
ERYTHROCYTE [DISTWIDTH] IN BLOOD BY AUTOMATED COUNT: 12.8 % (ref 11.6–14.5)
GLUCOSE SERPL-MCNC: 78 MG/DL (ref 74–99)
HCT VFR BLD AUTO: 32 % (ref 35–45)
HGB BLD-MCNC: 10 G/DL (ref 12–16)
IMM GRANULOCYTES # BLD AUTO: 0 K/UL (ref 0–0.04)
IMM GRANULOCYTES NFR BLD AUTO: 0 % (ref 0–0.5)
LYMPHOCYTES # BLD: 1.2 K/UL (ref 0.9–3.6)
LYMPHOCYTES NFR BLD: 27 % (ref 21–52)
MCH RBC QN AUTO: 28.6 PG (ref 24–34)
MCHC RBC AUTO-ENTMCNC: 31.3 G/DL (ref 31–37)
MCV RBC AUTO: 91.4 FL (ref 78–100)
MONOCYTES # BLD: 0.4 K/UL (ref 0.05–1.2)
MONOCYTES NFR BLD: 9 % (ref 3–10)
NEUTS SEG # BLD: 2.6 K/UL (ref 1.8–8)
NEUTS SEG NFR BLD: 60 % (ref 40–73)
NRBC # BLD: 0 K/UL (ref 0–0.01)
NRBC BLD-RTO: 0 PER 100 WBC
PLATELET # BLD AUTO: 266 K/UL (ref 135–420)
PMV BLD AUTO: 9.8 FL (ref 9.2–11.8)
POTASSIUM SERPL-SCNC: 4.6 MMOL/L (ref 3.5–5.5)
RBC # BLD AUTO: 3.5 M/UL (ref 4.2–5.3)
SODIUM SERPL-SCNC: 138 MMOL/L (ref 136–145)
WBC # BLD AUTO: 4.3 K/UL (ref 4.6–13.2)

## 2023-10-20 PROCEDURE — 6360000002 HC RX W HCPCS: Performed by: STUDENT IN AN ORGANIZED HEALTH CARE EDUCATION/TRAINING PROGRAM

## 2023-10-20 PROCEDURE — 97535 SELF CARE MNGMENT TRAINING: CPT

## 2023-10-20 PROCEDURE — 1100000003 HC PRIVATE W/ TELEMETRY

## 2023-10-20 PROCEDURE — 99222 1ST HOSP IP/OBS MODERATE 55: CPT | Performed by: NURSE PRACTITIONER

## 2023-10-20 PROCEDURE — 97165 OT EVAL LOW COMPLEX 30 MIN: CPT

## 2023-10-20 PROCEDURE — 94761 N-INVAS EAR/PLS OXIMETRY MLT: CPT

## 2023-10-20 PROCEDURE — 85730 THROMBOPLASTIN TIME PARTIAL: CPT

## 2023-10-20 PROCEDURE — 97162 PT EVAL MOD COMPLEX 30 MIN: CPT

## 2023-10-20 PROCEDURE — 82550 ASSAY OF CK (CPK): CPT

## 2023-10-20 PROCEDURE — 85025 COMPLETE CBC W/AUTO DIFF WBC: CPT

## 2023-10-20 PROCEDURE — 87040 BLOOD CULTURE FOR BACTERIA: CPT

## 2023-10-20 PROCEDURE — 99232 SBSQ HOSP IP/OBS MODERATE 35: CPT | Performed by: HOSPITALIST

## 2023-10-20 PROCEDURE — 97530 THERAPEUTIC ACTIVITIES: CPT

## 2023-10-20 PROCEDURE — 6370000000 HC RX 637 (ALT 250 FOR IP): Performed by: STUDENT IN AN ORGANIZED HEALTH CARE EDUCATION/TRAINING PROGRAM

## 2023-10-20 PROCEDURE — 80048 BASIC METABOLIC PNL TOTAL CA: CPT

## 2023-10-20 PROCEDURE — 2580000003 HC RX 258: Performed by: STUDENT IN AN ORGANIZED HEALTH CARE EDUCATION/TRAINING PROGRAM

## 2023-10-20 RX ADMIN — WATER 1000 MG: 1 INJECTION INTRAMUSCULAR; INTRAVENOUS; SUBCUTANEOUS at 17:26

## 2023-10-20 RX ADMIN — HEPARIN SODIUM 22 UNITS/KG/HR: 10000 INJECTION, SOLUTION INTRAVENOUS at 16:02

## 2023-10-20 RX ADMIN — HEPARIN SODIUM 22 UNITS/KG/HR: 10000 INJECTION, SOLUTION INTRAVENOUS at 11:04

## 2023-10-20 RX ADMIN — SODIUM CHLORIDE, PRESERVATIVE FREE 10 ML: 5 INJECTION INTRAVENOUS at 21:06

## 2023-10-20 RX ADMIN — Medication 100 MG: at 10:58

## 2023-10-20 RX ADMIN — Medication: at 10:58

## 2023-10-20 RX ADMIN — THERA TABS 1 TABLET: TAB at 10:58

## 2023-10-20 RX ADMIN — SODIUM CHLORIDE, PRESERVATIVE FREE 5 ML: 5 INJECTION INTRAVENOUS at 10:58

## 2023-10-20 RX ADMIN — SODIUM CHLORIDE, SODIUM LACTATE, POTASSIUM CHLORIDE, AND CALCIUM CHLORIDE: 600; 310; 30; 20 INJECTION, SOLUTION INTRAVENOUS at 13:06

## 2023-10-20 RX ADMIN — Medication: at 21:06

## 2023-10-20 NOTE — PLAN OF CARE
Problem: Discharge Planning  Goal: Discharge to home or other facility with appropriate resources  Outcome: Progressing     Problem: Safety - Adult  Goal: Free from fall injury  Outcome: Progressing     Problem: Nutrition Deficit:  Goal: Optimize nutritional status  Outcome: Progressing  Flowsheets (Taken 10/19/2023 1123 by Chilo June RD)  Nutrient intake appropriate for improving, restoring, or maintaining nutritional needs: Monitor oral intake, labs, and treatment plans     Problem: Skin/Tissue Integrity  Goal: Absence of new skin breakdown  Description: 1. Monitor for areas of redness and/or skin breakdown  2. Assess vascular access sites hourly  3. Every 4-6 hours minimum:  Change oxygen saturation probe site  4. Every 4-6 hours:  If on nasal continuous positive airway pressure, respiratory therapy assess nares and determine need for appliance change or resting period.   Outcome: Progressing     Problem: Chronic Conditions and Co-morbidities  Goal: Patient's chronic conditions and co-morbidity symptoms are monitored and maintained or improved  Outcome: Progressing     Problem: Neurosensory - Adult  Goal: Achieves stable or improved neurological status  Outcome: Progressing  Goal: Achieves maximal functionality and self care  Outcome: Progressing     Problem: Respiratory - Adult  Goal: Achieves optimal ventilation and oxygenation  Outcome: Progressing     Problem: Cardiovascular - Adult  Goal: Maintains optimal cardiac output and hemodynamic stability  Outcome: Progressing  Goal: Absence of cardiac dysrhythmias or at baseline  Outcome: Progressing     Problem: Skin/Tissue Integrity - Adult  Goal: Skin integrity remains intact  Outcome: Progressing  Goal: Oral mucous membranes remain intact  Outcome: Progressing     Problem: Musculoskeletal - Adult  Goal: Return mobility to safest level of function  Outcome: Progressing  Goal: Return ADL status to a safe level of function  Outcome: Progressing

## 2023-10-20 NOTE — PLAN OF CARE
Problem: Occupational Therapy - Adult  Goal: By Discharge: Performs self-care activities at highest level of function for planned discharge setting. See evaluation for individualized goals. Description: Occupational Therapy Goals:  Initiated 10/20/2023 to be met within 7-10 days. 1.  Patient will perform bed  mobility for ADLs with supervision/set-up. 2.  Patient will perform functional task while sitting EOB with Fair+ balance for > 5 min with supervision/set-up. 3.  Patient will perform upper body dressing with supervision/set-up. 4.  Patient will perform BSC transfers with minimal assistance/contact guard assist.  5.  Patient will perform all aspects of toileting with minimal assistance/contact guard assist.  6.  Patient will participate in upper extremity therapeutic exercise/activities with supervision/set-up for 8 minutes to improve endurance and UB strength needed for ADLs    7. Patient will utilize energy conservation techniques during functional activities with verbal cues. PLOF: Pt reports she lives alone, Mod Ind for ADLs and requires assistance for IADLs. Pt reports her spiritual daughter assists prn. Outcome: Progressing  OCCUPATIONAL THERAPY EVALUATION    Patient: Trevor Noriega (20 y.o. female)  Date: 10/20/2023  Primary Diagnosis: Pulmonary embolism on right [facility]) [I26.99]  Bladder prolapse, female, acquired [N81.10]  Deep vein thrombosis (DVT) of proximal vein of left lower extremity, unspecified chronicity (HCC) [I82.4Y2]  Single subsegmental pulmonary embolism without acute cor pulmonale (HCC) [I26.93]  Procedure(s) (LRB):  INSERTION VENA CAVA FILTER Right (N/A) 1 Day Post-Op   Precautions: Fall Risk    ASSESSMENT :    Pt cleared to participate in OT evaluation by RN. Upon entering room, pt received in bed, alert, and agreeable to OT eval/treatment. Pt required additional time for commands processing during functional activities.  Pt agreeable to maneuver to EOB for ADLs, required

## 2023-10-20 NOTE — ANESTHESIA POSTPROCEDURE EVALUATION
Department of Anesthesiology  Postprocedure Note    Patient: Rosalee Otero  MRN: [id number]  YOB: 1932  Date of evaluation: 10/20/2023      Procedure Summary     Date: 10/19/23 Room / Location: [facility] CVT 02 / [facility] CARDIAC SURGERY    Anesthesia Start: 1531 Anesthesia Stop: 1621    Procedure: INSERTION VENA CAVA FILTER Right Diagnosis:       Deep vein thrombosis (DVT) of proximal vein of left lower extremity, unspecified chronicity (HCC)      (Deep vein thrombosis (DVT) of proximal vein of left lower extremity, unspecified chronicity ([address]) [I82.4Y2])    Surgeons: Checo Bass MD Responsible Provider: Juvenal Molina MD    Anesthesia Type: MAC ASA Status: 4 - Emergent          Anesthesia Type: MAC    Harley Phase I:      Harley Phase II:        Anesthesia Post Evaluation    Patient location during evaluation: PACU  Patient participation: complete - patient participated  Level of consciousness: awake  Airway patency: patent  Nausea & Vomiting: no vomiting  Complications: no  Cardiovascular status: hemodynamically stable  Respiratory status: acceptable  Hydration status: stable  Pain management: adequate

## 2023-10-20 NOTE — SIGNIFICANT EVENT
RN notified of critical lab result. 1/2 Blood Cx: Gram + Rods    Possible organisms: Bacillus, Clostridium, Corynebacterium, Listeria, and Gardnerella. Ceftriaxone should have adequate empiric coverage.     Plan:  - ID Consult in AM by day hospitalist  - Repeat Blood Cx at 24 hr james Caceres MD  10:44 PM

## 2023-10-20 NOTE — CONSULTS
[address] Floor: [phone] ([id number])  [facility]: [phone]     Patient Name: Melanie Marrufo  YOB: 1932    Date of consult : 10/20/23  Reason for Consult: establish goals of care  Requesting Provider: Andreia Denson MD    Primary Care Physician: Harshad Victor MD      SUMMARY:   Melanie Marrufo is a 80 y.o. female with a past history of advanced age,bladder prolapse, who was admitted on 10/18/2023 from home where she lives alone with a diagnosis of repeated falling, Subsegmental PE on right, Significant occlusive and nonocclusive right lower extremity DVTs, Rhabdomyolysis, Type II NSTEMI, Hypokalemia, Chronic anemia, Pyuria concerning for UTI without symptoms. Current medical issues leading to Palliative Medicine involvement include: pt with multiple issues including severe cachexia and advanced age with frailty leading to a likely poor prognosis. She and her family need to discuss options and make plans for supportive care. CHIEF COMPLAINT: lethargy and weakness     HPI/SUBJECTIVE:    Pt is a 80year old AAF that lives by herself and has been quite independent. She has two sons and a \"good friend\" that have been helping her out. She recently starting having multiple falls and becoming weaker. She was brought into the ED post a fall with possible rhabdomyolysis and DVT with PE. Pt is extremely cachetic with BMI of 16.19. She is weak and lethargic and in need of supportive care at this time. She underwent filter placement for prevention of VTE.      The patient is:   [x] Verbal and participatory  [] Non-participatory due to:     GOALS OF CARE:   DNR      TREATMENT PREFERENCES:   Code Status: DNR         PALLIATIVE DIAGNOSES:   Goals of care/ACP  Encounter for palliative medicine  Advanced age  DVTs/PE  Cachexia/debility  Repeated falls with rhabdomyolysis  NSTEMI type II  Initial consult note routed to primary continuity provider  Communicated

## 2023-10-20 NOTE — PLAN OF CARE
Problem: Physical Therapy - Adult  Goal: By Discharge: Performs mobility at highest level of function for planned discharge setting. See evaluation for individualized goals. Description: Physical Therapy Goals:  Initiated 10/20/2023 to be met within 7-10 days. 1.  Patient will move from supine to sit and sit to supine  in bed with minimal assistance/contact guard assist.    2.  Patient will transfer from bed to chair and chair to bed with moderate assistance  using the least restrictive device. 3.  Patient will perform sit to stand with moderate assistance . 4. Patient will ambulate with moderate assistance  for 10 feet with the least restrictive device. PLOF: lives alone, ambulated with rollator; neighbors assist with meals     Outcome: Progressing   PHYSICAL THERAPY EVALUATION    Patient: Tracy Castillo (28 y.o. female)  Date: 10/20/2023  Primary Diagnosis: Pulmonary embolism on right [facility]) [I26.99]  Bladder prolapse, female, acquired [N81.10]  Deep vein thrombosis (DVT) of proximal vein of left lower extremity, unspecified chronicity ([address]) [I82.4Y2]  Single subsegmental pulmonary embolism without acute cor pulmonale (HCC) [I26.93]  Procedure(s) (LRB):  INSERTION VENA CAVA FILTER Right (N/A) 1 Day Post-Op   Precautions: Fall Risk,    ASSESSMENT :  Pt in bed and agreeable to PT evaluation. Pt required significantly increased assistance with mobility due to extreme fear of falling. Pt transferred supine to sit with mod A. In sitting patient's legs were held out in extension with a heavy posterior lean requiring max A to maintain sitting balance. Attempted to have patient bed her knees so her feet would be flat on the floor but she was resisting this stating that she was going to fall. Attempted to educate patient that her feet being on the floor would make her more stable however she was unable to receive this information because of her fear. Pt was returned to supine in bed with max A.   Pt

## 2023-10-21 LAB
ANION GAP SERPL CALC-SCNC: 1 MMOL/L (ref 3–18)
APTT PPP: 75.5 SEC (ref 23–36.4)
BACTERIA SPEC CULT: ABNORMAL
BASOPHILS # BLD: 0 K/UL (ref 0–0.1)
BASOPHILS NFR BLD: 1 % (ref 0–2)
BUN SERPL-MCNC: 11 MG/DL (ref 7–18)
BUN/CREAT SERPL: 22 (ref 12–20)
CALCIUM SERPL-MCNC: 8.4 MG/DL (ref 8.5–10.1)
CHLORIDE SERPL-SCNC: 106 MMOL/L (ref 100–111)
CK SERPL-CCNC: 332 U/L (ref 26–192)
CO2 SERPL-SCNC: 32 MMOL/L (ref 21–32)
CREAT SERPL-MCNC: 0.51 MG/DL (ref 0.6–1.3)
DIFFERENTIAL METHOD BLD: ABNORMAL
EOSINOPHIL # BLD: 0.2 K/UL (ref 0–0.4)
EOSINOPHIL NFR BLD: 4 % (ref 0–5)
ERYTHROCYTE [DISTWIDTH] IN BLOOD BY AUTOMATED COUNT: 12.8 % (ref 11.6–14.5)
GLUCOSE SERPL-MCNC: 95 MG/DL (ref 74–99)
GRAM STN SPEC: ABNORMAL
GRAM STN SPEC: ABNORMAL
HCT VFR BLD AUTO: 33.6 % (ref 35–45)
HGB BLD-MCNC: 10.4 G/DL (ref 12–16)
IMM GRANULOCYTES # BLD AUTO: 0 K/UL (ref 0–0.04)
IMM GRANULOCYTES NFR BLD AUTO: 0 % (ref 0–0.5)
LYMPHOCYTES # BLD: 1.2 K/UL (ref 0.9–3.6)
LYMPHOCYTES NFR BLD: 29 % (ref 21–52)
MCH RBC QN AUTO: 28.3 PG (ref 24–34)
MCHC RBC AUTO-ENTMCNC: 31 G/DL (ref 31–37)
MCV RBC AUTO: 91.6 FL (ref 78–100)
MONOCYTES # BLD: 0.4 K/UL (ref 0.05–1.2)
MONOCYTES NFR BLD: 9 % (ref 3–10)
NEUTS SEG # BLD: 2.4 K/UL (ref 1.8–8)
NEUTS SEG NFR BLD: 57 % (ref 40–73)
NRBC # BLD: 0 K/UL (ref 0–0.01)
NRBC BLD-RTO: 0 PER 100 WBC
PLATELET # BLD AUTO: 295 K/UL (ref 135–420)
PMV BLD AUTO: 9.9 FL (ref 9.2–11.8)
POTASSIUM SERPL-SCNC: 5 MMOL/L (ref 3.5–5.5)
RBC # BLD AUTO: 3.67 M/UL (ref 4.2–5.3)
SERVICE CMNT-IMP: ABNORMAL
SODIUM SERPL-SCNC: 139 MMOL/L (ref 136–145)
TROPONIN I SERPL HS-MCNC: 116 NG/L (ref 0–54)
WBC # BLD AUTO: 4.2 K/UL (ref 4.6–13.2)

## 2023-10-21 PROCEDURE — 84484 ASSAY OF TROPONIN QUANT: CPT

## 2023-10-21 PROCEDURE — 80048 BASIC METABOLIC PNL TOTAL CA: CPT

## 2023-10-21 PROCEDURE — 85730 THROMBOPLASTIN TIME PARTIAL: CPT

## 2023-10-21 PROCEDURE — 6370000000 HC RX 637 (ALT 250 FOR IP): Performed by: HOSPITALIST

## 2023-10-21 PROCEDURE — 1100000003 HC PRIVATE W/ TELEMETRY

## 2023-10-21 PROCEDURE — 2580000003 HC RX 258: Performed by: STUDENT IN AN ORGANIZED HEALTH CARE EDUCATION/TRAINING PROGRAM

## 2023-10-21 PROCEDURE — 85025 COMPLETE CBC W/AUTO DIFF WBC: CPT

## 2023-10-21 PROCEDURE — 6370000000 HC RX 637 (ALT 250 FOR IP): Performed by: STUDENT IN AN ORGANIZED HEALTH CARE EDUCATION/TRAINING PROGRAM

## 2023-10-21 PROCEDURE — 36415 COLL VENOUS BLD VENIPUNCTURE: CPT

## 2023-10-21 PROCEDURE — 99232 SBSQ HOSP IP/OBS MODERATE 35: CPT | Performed by: HOSPITALIST

## 2023-10-21 PROCEDURE — 82550 ASSAY OF CK (CPK): CPT

## 2023-10-21 RX ORDER — AMLODIPINE BESYLATE 5 MG/1
5 TABLET ORAL DAILY
Status: DISCONTINUED | OUTPATIENT
Start: 2023-10-21 | End: 2023-10-24 | Stop reason: HOSPADM

## 2023-10-21 RX ORDER — CEFUROXIME AXETIL 250 MG/1
500 TABLET ORAL EVERY 12 HOURS SCHEDULED
Status: COMPLETED | OUTPATIENT
Start: 2023-10-21 | End: 2023-10-22

## 2023-10-21 RX ADMIN — APIXABAN 10 MG: 5 TABLET, FILM COATED ORAL at 20:06

## 2023-10-21 RX ADMIN — SODIUM CHLORIDE, PRESERVATIVE FREE 10 ML: 5 INJECTION INTRAVENOUS at 10:29

## 2023-10-21 RX ADMIN — Medication: at 20:07

## 2023-10-21 RX ADMIN — AMLODIPINE BESYLATE 5 MG: 5 TABLET ORAL at 10:13

## 2023-10-21 RX ADMIN — APIXABAN 10 MG: 5 TABLET, FILM COATED ORAL at 12:59

## 2023-10-21 RX ADMIN — CEFUROXIME AXETIL 500 MG: 250 TABLET, FILM COATED ORAL at 20:06

## 2023-10-21 RX ADMIN — CEFUROXIME AXETIL 500 MG: 250 TABLET, FILM COATED ORAL at 10:13

## 2023-10-21 RX ADMIN — Medication: at 10:42

## 2023-10-21 RX ADMIN — Medication 100 MG: at 10:13

## 2023-10-21 RX ADMIN — THERA TABS 1 TABLET: TAB at 10:13

## 2023-10-21 RX ADMIN — SODIUM CHLORIDE, PRESERVATIVE FREE 10 ML: 5 INJECTION INTRAVENOUS at 20:06

## 2023-10-22 PROCEDURE — 1100000003 HC PRIVATE W/ TELEMETRY

## 2023-10-22 PROCEDURE — 6370000000 HC RX 637 (ALT 250 FOR IP): Performed by: STUDENT IN AN ORGANIZED HEALTH CARE EDUCATION/TRAINING PROGRAM

## 2023-10-22 PROCEDURE — 2580000003 HC RX 258: Performed by: STUDENT IN AN ORGANIZED HEALTH CARE EDUCATION/TRAINING PROGRAM

## 2023-10-22 PROCEDURE — 99232 SBSQ HOSP IP/OBS MODERATE 35: CPT | Performed by: HOSPITALIST

## 2023-10-22 PROCEDURE — 94761 N-INVAS EAR/PLS OXIMETRY MLT: CPT

## 2023-10-22 PROCEDURE — 6370000000 HC RX 637 (ALT 250 FOR IP): Performed by: HOSPITALIST

## 2023-10-22 RX ADMIN — APIXABAN 10 MG: 5 TABLET, FILM COATED ORAL at 10:58

## 2023-10-22 RX ADMIN — CEFUROXIME AXETIL 500 MG: 250 TABLET, FILM COATED ORAL at 10:58

## 2023-10-22 RX ADMIN — Medication: at 20:23

## 2023-10-22 RX ADMIN — CEFUROXIME AXETIL 500 MG: 250 TABLET, FILM COATED ORAL at 20:22

## 2023-10-22 RX ADMIN — Medication 100 MG: at 10:59

## 2023-10-22 RX ADMIN — AMLODIPINE BESYLATE 5 MG: 5 TABLET ORAL at 10:59

## 2023-10-22 RX ADMIN — SODIUM CHLORIDE, PRESERVATIVE FREE 10 ML: 5 INJECTION INTRAVENOUS at 20:24

## 2023-10-22 RX ADMIN — SODIUM CHLORIDE, PRESERVATIVE FREE 10 ML: 5 INJECTION INTRAVENOUS at 10:59

## 2023-10-22 RX ADMIN — Medication: at 10:56

## 2023-10-22 RX ADMIN — APIXABAN 10 MG: 5 TABLET, FILM COATED ORAL at 20:22

## 2023-10-22 RX ADMIN — THERA TABS 1 TABLET: TAB at 10:59

## 2023-10-22 NOTE — PLAN OF CARE
Problem: Discharge Planning  Goal: Discharge to home or other facility with appropriate resources  Outcome: Progressing     Problem: Safety - Adult  Goal: Free from fall injury  Outcome: Progressing     Problem: Nutrition Deficit:  Goal: Optimize nutritional status  Outcome: Progressing     Problem: Skin/Tissue Integrity  Goal: Absence of new skin breakdown  Description: 1. Monitor for areas of redness and/or skin breakdown  2. Assess vascular access sites hourly  3. Every 4-6 hours minimum:  Change oxygen saturation probe site  4. Every 4-6 hours:  If on nasal continuous positive airway pressure, respiratory therapy assess nares and determine need for appliance change or resting period.   Outcome: Progressing     Problem: Chronic Conditions and Co-morbidities  Goal: Patient's chronic conditions and co-morbidity symptoms are monitored and maintained or improved  Outcome: Progressing     Problem: Neurosensory - Adult  Goal: Achieves stable or improved neurological status  Outcome: Progressing  Goal: Achieves maximal functionality and self care  Outcome: Progressing     Problem: Respiratory - Adult  Goal: Achieves optimal ventilation and oxygenation  Outcome: Progressing     Problem: Cardiovascular - Adult  Goal: Maintains optimal cardiac output and hemodynamic stability  Outcome: Progressing  Goal: Absence of cardiac dysrhythmias or at baseline  Outcome: Progressing     Problem: Skin/Tissue Integrity - Adult  Goal: Skin integrity remains intact  Outcome: Progressing  Goal: Oral mucous membranes remain intact  Outcome: Progressing     Problem: Musculoskeletal - Adult  Goal: Return mobility to safest level of function  Outcome: Progressing  Goal: Return ADL status to a safe level of function  Outcome: Progressing     Problem: Gastrointestinal - Adult  Goal: Maintains adequate nutritional intake  Outcome: Progressing

## 2023-10-23 ENCOUNTER — HOSPICE ADMISSION (OUTPATIENT)
Age: 88
End: 2023-10-23
Payer: MEDICARE

## 2023-10-23 LAB
HCT VFR BLD AUTO: 33.9 % (ref 35–45)
HGB BLD-MCNC: 10.3 G/DL (ref 12–16)

## 2023-10-23 PROCEDURE — 1100000003 HC PRIVATE W/ TELEMETRY

## 2023-10-23 PROCEDURE — 85018 HEMOGLOBIN: CPT

## 2023-10-23 PROCEDURE — 85014 HEMATOCRIT: CPT

## 2023-10-23 PROCEDURE — 94761 N-INVAS EAR/PLS OXIMETRY MLT: CPT

## 2023-10-23 PROCEDURE — 2580000003 HC RX 258: Performed by: STUDENT IN AN ORGANIZED HEALTH CARE EDUCATION/TRAINING PROGRAM

## 2023-10-23 PROCEDURE — 99233 SBSQ HOSP IP/OBS HIGH 50: CPT | Performed by: NURSE PRACTITIONER

## 2023-10-23 PROCEDURE — 6370000000 HC RX 637 (ALT 250 FOR IP): Performed by: STUDENT IN AN ORGANIZED HEALTH CARE EDUCATION/TRAINING PROGRAM

## 2023-10-23 PROCEDURE — 99233 SBSQ HOSP IP/OBS HIGH 50: CPT | Performed by: HOSPITALIST

## 2023-10-23 PROCEDURE — 36415 COLL VENOUS BLD VENIPUNCTURE: CPT

## 2023-10-23 PROCEDURE — 6370000000 HC RX 637 (ALT 250 FOR IP): Performed by: HOSPITALIST

## 2023-10-23 RX ADMIN — THERA TABS 1 TABLET: TAB at 10:12

## 2023-10-23 RX ADMIN — AMLODIPINE BESYLATE 5 MG: 5 TABLET ORAL at 10:11

## 2023-10-23 RX ADMIN — Medication 100 MG: at 10:11

## 2023-10-23 RX ADMIN — SODIUM CHLORIDE, PRESERVATIVE FREE 10 ML: 5 INJECTION INTRAVENOUS at 10:12

## 2023-10-23 RX ADMIN — APIXABAN 10 MG: 5 TABLET, FILM COATED ORAL at 22:26

## 2023-10-23 RX ADMIN — SODIUM CHLORIDE, PRESERVATIVE FREE 10 ML: 5 INJECTION INTRAVENOUS at 22:30

## 2023-10-23 RX ADMIN — APIXABAN 10 MG: 5 TABLET, FILM COATED ORAL at 10:11

## 2023-10-23 RX ADMIN — Medication: at 10:13

## 2023-10-23 RX ADMIN — Medication: at 22:29

## 2023-10-24 ENCOUNTER — HOME CARE VISIT (OUTPATIENT)
Age: 88
End: 2023-10-24
Payer: MEDICARE

## 2023-10-24 VITALS
WEIGHT: 94.36 LBS | BODY MASS INDEX: 16.11 KG/M2 | OXYGEN SATURATION: 100 % | DIASTOLIC BLOOD PRESSURE: 71 MMHG | SYSTOLIC BLOOD PRESSURE: 123 MMHG | TEMPERATURE: 97.6 F | HEART RATE: 83 BPM | RESPIRATION RATE: 16 BRPM | HEIGHT: 64 IN

## 2023-10-24 VITALS
HEIGHT: 64 IN | DIASTOLIC BLOOD PRESSURE: 79 MMHG | TEMPERATURE: 97.6 F | BODY MASS INDEX: 16.05 KG/M2 | SYSTOLIC BLOOD PRESSURE: 156 MMHG | HEART RATE: 66 BPM | WEIGHT: 94 LBS | RESPIRATION RATE: 18 BRPM | OXYGEN SATURATION: 96 %

## 2023-10-24 PROBLEM — M62.82 NON-TRAUMATIC RHABDOMYOLYSIS: Status: RESOLVED | Noted: 2023-10-20 | Resolved: 2023-10-24

## 2023-10-24 LAB
BACTERIA SPEC CULT: NORMAL
SERVICE CMNT-IMP: NORMAL

## 2023-10-24 PROCEDURE — G0299 HHS/HOSPICE OF RN EA 15 MIN: HCPCS

## 2023-10-24 PROCEDURE — 2500000001 HSPC NON INJECTABLE MED

## 2023-10-24 PROCEDURE — 0651 HSPC ROUTINE HOME CARE

## 2023-10-24 PROCEDURE — 6370000000 HC RX 637 (ALT 250 FOR IP): Performed by: HOSPITALIST

## 2023-10-24 PROCEDURE — 99232 SBSQ HOSP IP/OBS MODERATE 35: CPT | Performed by: NURSE PRACTITIONER

## 2023-10-24 PROCEDURE — 94761 N-INVAS EAR/PLS OXIMETRY MLT: CPT

## 2023-10-24 PROCEDURE — 99239 HOSP IP/OBS DSCHRG MGMT >30: CPT | Performed by: HOSPITALIST

## 2023-10-24 PROCEDURE — 2580000003 HC RX 258: Performed by: STUDENT IN AN ORGANIZED HEALTH CARE EDUCATION/TRAINING PROGRAM

## 2023-10-24 PROCEDURE — 6370000000 HC RX 637 (ALT 250 FOR IP): Performed by: STUDENT IN AN ORGANIZED HEALTH CARE EDUCATION/TRAINING PROGRAM

## 2023-10-24 RX ORDER — PETROLATUM,WHITE/LANOLIN
OINTMENT (GRAM) TOPICAL
Qty: 1 EACH | Refills: 0 | Status: SHIPPED | OUTPATIENT
Start: 2023-10-24

## 2023-10-24 RX ORDER — THIAMINE MONONITRATE (VIT B1) 100 MG
100 TABLET ORAL DAILY
Qty: 30 TABLET | Refills: 0 | Status: SHIPPED | OUTPATIENT
Start: 2023-10-24

## 2023-10-24 RX ORDER — BISACODYL 10 MG
10 SUPPOSITORY, RECTAL RECTAL DAILY PRN
Qty: 10 SUPPOSITORY | Refills: 0 | Status: SHIPPED | OUTPATIENT
Start: 2023-10-24 | End: 2023-11-23

## 2023-10-24 RX ORDER — AMLODIPINE BESYLATE 5 MG/1
5 TABLET ORAL DAILY
Qty: 30 TABLET | Refills: 3 | Status: SHIPPED | OUTPATIENT
Start: 2023-10-24

## 2023-10-24 RX ORDER — MORPHINE SULFATE 100 MG/5ML
5 SOLUTION ORAL
Qty: 30 ML | Refills: 0 | Status: SHIPPED | OUTPATIENT
Start: 2023-10-24 | End: 2023-10-27

## 2023-10-24 RX ORDER — LORAZEPAM 2 MG/ML
0.5 CONCENTRATE ORAL EVERY 4 HOURS PRN
Qty: 30 ML | Refills: 0 | Status: SHIPPED | OUTPATIENT
Start: 2023-10-24 | End: 2023-10-24 | Stop reason: SDUPTHER

## 2023-10-24 RX ORDER — MULTIVITAMIN WITH IRON
1 TABLET ORAL DAILY
Qty: 30 TABLET | Refills: 0 | Status: SHIPPED | OUTPATIENT
Start: 2023-10-24 | End: 2023-10-24

## 2023-10-24 RX ORDER — ACETAMINOPHEN 650 MG/1
650 SUPPOSITORY RECTAL EVERY 4 HOURS PRN
Qty: 10 SUPPOSITORY | Refills: 0 | Status: SHIPPED | OUTPATIENT
Start: 2023-10-24

## 2023-10-24 RX ORDER — MORPHINE SULFATE 100 MG/5ML
5 SOLUTION ORAL
Qty: 30 ML | Refills: 0 | Status: SHIPPED | OUTPATIENT
Start: 2023-10-24 | End: 2023-10-24 | Stop reason: SDUPTHER

## 2023-10-24 RX ORDER — LORAZEPAM 2 MG/ML
0.5 CONCENTRATE ORAL EVERY 4 HOURS PRN
Qty: 30 ML | Refills: 0 | Status: SHIPPED | OUTPATIENT
Start: 2023-10-24 | End: 2023-11-07

## 2023-10-24 RX ADMIN — THERA TABS 1 TABLET: TAB at 09:30

## 2023-10-24 RX ADMIN — APIXABAN 10 MG: 5 TABLET, FILM COATED ORAL at 09:00

## 2023-10-24 RX ADMIN — SODIUM CHLORIDE, PRESERVATIVE FREE 10 ML: 5 INJECTION INTRAVENOUS at 09:35

## 2023-10-24 RX ADMIN — Medication 100 MG: at 10:31

## 2023-10-24 RX ADMIN — AMLODIPINE BESYLATE 5 MG: 5 TABLET ORAL at 09:31

## 2023-10-24 ASSESSMENT — ENCOUNTER SYMPTOMS
CONSTIPATION: 1
HEMOPTYSIS: 0

## 2023-10-24 NOTE — HOSPICE
Hospice Admission Summary  Trevor Renteria, 80year old female, admitted to Hospice services for a terminal diagnosis of Failure to thrive and severe protein calorie malnutrition. Patient has elected hospice services and is no longer seeking aggressive treatment. Co-morbidities related to the terminal diagnosis are malnutrition, debility, DVT, PE and NSTEMI. Patient also has a past medical history of HTN, prolapsed bladder. Mrs. Rossills status 6 months - 1 year prior to hospice admission the patient was living on her own and taking care of herself. She has had increased fatigue, falls and weight loss as well. At her highest weight she was 160lbs and now weight on admission is 94. The patient states that she has no appetite and must force herself to eat. She has a large, prolapsed bladder that causes her no discomfort currently. The patient was discharged from [facility] where she was evaluating for her fatigue and swelling to her EVIE and LLE. She was found to have significant clot burden the LUE and LLE. CTA chest did show a small subsegmental PE.  CTA head showed no acute infarcts and CT abdomen/pelvis showed no evidence of any infection, just the prolapsed bladder. The patient didn't want any further workup and opted for Hospice services. The patient/family is present and willing and safely able to provide care and administer medications, their availability is daily 24-7 and as needed. The patient/family participated in goal setting, care planning, and are agreeable to the care plan. Admission booklet reviewed with patient/family; services provided under hospice benefit, review of rights and responsibilities, disposal of medications, contact information for , Joint Commission, Medicare, O, and outside resources for independence. The patient/family educated on IDT and their right to attend meetings.   Education provided regarding 24-hour availability of hospice services and

## 2023-10-24 NOTE — DISCHARGE INSTRUCTIONS
Discharge Instructions    Patient: Rosina Glass MRN: [id number]  CSN: [id number]    YOB: 1932  Age: 80 y.o.   Sex: female    DOA: 10/18/2023       DIET:  cardiac diet    ACTIVITY: activity as tolerated  Home health care for hospice care    ADDITIONAL INFORMATION: If you experience any of the following symptoms but not limited to Fever, chills, nausea, vomiting, diarrhea, change in mentation, falling, bleeding, shortness of breath, chest pain, please call your hospice care nurse:     FOLLOW UP CARE:  PCP as needed    Neel Elam MD  10/24/2023 9:06 AM

## 2023-10-24 NOTE — CARE COORDINATION
ALEJANDRA notified by Shae with [facility] that DME will be delivered this evening, and request medical transport for noon tomorrow.      Marco Snow, FRANKN, RN  Care Management  Phone: [phone]
CM met with the patient, her son Sharon Nguyen, and friend Pam Go at the bedside. Patient has decided on home with hospice. CM advised family and friend to clear the area for the hospital bed and other equipment today for discharge tomorrow. They verbalized understanding. Dr. Umu Park updated via [employer].      RYLEE Aggarwal, RN  Care Management  Phone: [phone]
CM spoke with the patient's son Nory Rehman and verified that DME was delivered last night. CM informed Mr. Jac Reis that medical transport is scheduled for noon today however the specified time is not guaranteed; and to clear his schedule for at least an hour prior and 2 hours after the specified time. He verbalized understanding. Dr. Mukund Dinero updated via [address] [place] that transport has been scheduled for noon today.      Rocio Pruett, BSN, RN  Care Management  Phone: [phone]
Call made to [facility] transportation, [phone], spoke with Jet Esparza, will transport patient on 10/24/2023 at noon.
Discharge order noted for today. Patient has been accepted to [facility]. Met with patient and spoke with her son Brayden Tobias via phone and both are agreeable to the transition plan today. Transport has been arranged through [employer] [facility]. Patient's discharge summary and home health  orders have been forwarded to [facility] ***. Updated bedside RN, ***,  to the transition plan.   Discharge information has been documented on the AVS.       ***
Requested Case Management specialist to assist with transportation to home. Address is [address] [address] and phone number is [phone]  Patient will require BLS transport. Pt requires Stretcher If stretcher, reason: [place] BMI is 16.19, DVTs/PE, Repeated falls, Subsegmental PE on right, Significant occlusive and nonocclusive right lower extremity DVTs  Patient is currently requiring oxygen: no   Height: 5'4   Weight: 94 lb  Pt is on isolation: no   Is the pt ready now? no  Requested time: 10/24/2023 at noon  PCS Faxed: no  Insurance verified on face sheet: yes  Auth needed for transport: no  CM completed PCS/ Envelope and placed on chart.      FRANK McdonaldN, RN  Care Management  Phone: [phone]
TRISH ROBERTS completed LTSS/UAI screening in medicaid portal. Screening ID # : [id number]. TRISH ROBERTS perfectserved Dr. Pepper Monsalve requesting signature. Pending physician signature. TRISH ROBERTS also left a message with patient Medicaid Benefits worker, MsViktoria Sandor Gabriel, at  [address] [facility] regarding LTC Medicaid application. TRISH ROBERTS will fax approved UAI/LTSS to benefits worker for processing LTC Medicaid application.      SAYRA Green  Care Management
Nursing Facility;Home Care   Potential DME Needed Hospital Bed   DME Ordered? Other (comment)  (TBD pending dispositon)   Potential Assistance Purchasing Medications No   Type of Home Care Services OT;PT;Nursing Services   Patient expects to be discharged to: Apartment  (Apartment vs SNF)   One/Two Story Residence One story   History of falls? 1   Services At/After Discharge   Transition of Care Consult (CM Consult) Home Health;SNF  (Home health vs SNF)   Internal Home Health No   Reason Outside Agency Chosen Managed care specific requirement   Services At/After Discharge   (TBD)   [address] Provided? No   Mode of Transport at Discharge Self  (Friend/caregiver)   Confirm Follow Up Transport Friends   Condition of Participation: Discharge Planning   The Patient and/or Patient Representative was provided with a Choice of Provider? Patient   The Patient and/Or Patient Representative agree with the Discharge Plan? Yes   Freedom of Choice list was provided with basic dialogue that supports the patient's individualized plan of care/goals, treatment preferences, and shares the quality data associated with the providers?   Yes         Kathryn Hankins RN  Case Management Department  Ph: [phone]

## 2023-10-24 NOTE — ACP (ADVANCE CARE PLANNING)
Advance Care Planning       Palliative Care Family Meeting      Date of Meeting: 10/23/2023    Conducted with: Patient's son (Kathy Girard), patient's other son Edith Bowman) via phone, patient's granddaughter Hanna Nickerson), patient's daughter-in-law Jessica Guillen, patient's friend/spiritual daughter Deni Jon), Nolvia Keith NP (Palliative) and this writer. Content/Action Overview:    Reviewed DNR/DNI and patient and her family agree to be a do not resuscitate. The Palliative Care team will complete a POLST document, prior to patient discharging from the hospital. The Palliative Care team addressed goals of care moving forward and possible hospice services.    ----------------------------------------------------------------  Advance Care Planning Conversation     The patient and/or family consented to a voluntary Advance Care Planning conversation. Individuals present for the conversation: Patient's son Kathy Girard), patient's other son Edith Bowman) via phone, patient's granddaughter Hanna Singh, patient's daughter-in-law Jessica Guillen, patient's friend/spiritual daughter Deni Jon), Nolvia Keith NP (Palliative) and this writer. Outcome of the conversation and documents completed: This writer, along with Nolvia Keith NP, with the Palliative Care team; met with patient's family today to give the family a medical update and to also discuss goals of care moving forward and the options that are available. Family, at first, was given a detailed medical update regarding patient's condition. Family was made aware that patient is very frail and has not been eating. Family spoke candidly about patient's decline in weight and overall health. Patient's friend Lenny Cabrera) has been very involved in the care of patient and she is truly worried about patient returning home alone. Family, at this time, would like patient to discharge to a skilled a nursing facility; for some rehab.  The Palliative Care
with Comfort Measures and NO feeding tubes. The original POLST and copy was placed in the discharge packet; to return home with patient.  ----------------------------------------------------------------      [facility] Med[facility]  [place]all[place] [facility] [place]   [address] [place] [address] Drive: [phone])

## 2023-10-25 ENCOUNTER — HOME CARE VISIT (OUTPATIENT)
Age: 88
End: 2023-10-25
Payer: MEDICARE

## 2023-10-25 VITALS
OXYGEN SATURATION: 99 % | SYSTOLIC BLOOD PRESSURE: 105 MMHG | RESPIRATION RATE: 18 BRPM | HEART RATE: 99 BPM | DIASTOLIC BLOOD PRESSURE: 63 MMHG | TEMPERATURE: 99 F

## 2023-10-25 PROCEDURE — G0156 HHCP-SVS OF AIDE,EA 15 MIN: HCPCS

## 2023-10-25 PROCEDURE — G0299 HHS/HOSPICE OF RN EA 15 MIN: HCPCS

## 2023-10-25 PROCEDURE — 0651 HSPC ROUTINE HOME CARE

## 2023-10-25 ASSESSMENT — ENCOUNTER SYMPTOMS: BOWEL INCONTINENCE: 1

## 2023-10-25 NOTE — HOSPICE
Patient/Caregiver/Family findings/issues during SN visit:  No issues found    Medication refills ordered this visit: Remaining comfort medications to be delivered today    Medications reconciled and all medications are available in the home this visit. Education was provided regarding medications, medication interactions, and look alike medications. Response to teaching. Patient and family verbalized understanding. Medications are not being used at this time. Supplies by type and quantity ordered this visit include: chux, wipes , briefs and lip balm given at this visit. Remaining supplies to be delivered    Consulted medical director/attending physician regarding: Not at this time    Instructed patient/family/caregiver on 24-hour hospice availability and phone number.     Plan for next visit:  Continued EOL education and support

## 2023-10-26 LAB
BACTERIA SPEC CULT: NORMAL
BACTERIA SPEC CULT: NORMAL
SERVICE CMNT-IMP: NORMAL
SERVICE CMNT-IMP: NORMAL

## 2023-10-26 PROCEDURE — 0651 HSPC ROUTINE HOME CARE

## 2023-10-27 ENCOUNTER — HOME CARE VISIT (OUTPATIENT)
Age: 88
End: 2023-10-27
Payer: MEDICARE

## 2023-10-27 VITALS
HEART RATE: 92 BPM | TEMPERATURE: 97.5 F | SYSTOLIC BLOOD PRESSURE: 129 MMHG | RESPIRATION RATE: 18 BRPM | DIASTOLIC BLOOD PRESSURE: 76 MMHG | OXYGEN SATURATION: 98 %

## 2023-10-27 PROCEDURE — 0651 HSPC ROUTINE HOME CARE

## 2023-10-27 PROCEDURE — G0299 HHS/HOSPICE OF RN EA 15 MIN: HCPCS

## 2023-10-27 PROCEDURE — G0156 HHCP-SVS OF AIDE,EA 15 MIN: HCPCS

## 2023-10-27 ASSESSMENT — ENCOUNTER SYMPTOMS: BOWEL INCONTINENCE: 1

## 2023-10-27 NOTE — HOSPICE
MSW calls son back for assessment visit. Son is not sure pt/family needs MSW support, but states MSW can come for the visit Tuesday. Missed visit placed and MSW will visit Tuesday.

## 2023-10-27 NOTE — HOSPICE
Patient/Caregiver/Family findings/issues during SN visit:  None identified    Medication refills ordered this visit:  None needed. comfort meds in home    Medications reconciled and all medications are available in the home this visit. Education was provided regarding medications, medication interactions, and look alike medications. Response to teaching. Family and patient verbalized understanding. Medications are effective at this time. Supplies by type and quantity ordered this visit include: chux, wipes and mouth swabs  confirmation#[id number]    Consulted medical director/attending physician regarding: Not at this time    Instructed patient/family/caregiver on 24-hour hospice availability and phone number.     Plan for next visit:  Continued EOL education and support

## 2023-10-28 PROCEDURE — 0651 HSPC ROUTINE HOME CARE

## 2023-10-29 PROCEDURE — 0651 HSPC ROUTINE HOME CARE

## 2023-10-30 ENCOUNTER — HOME CARE VISIT (OUTPATIENT)
Age: 88
End: 2023-10-30
Payer: MEDICARE

## 2023-10-30 VITALS
HEART RATE: 52 BPM | DIASTOLIC BLOOD PRESSURE: 78 MMHG | TEMPERATURE: 97.2 F | OXYGEN SATURATION: 100 % | SYSTOLIC BLOOD PRESSURE: 118 MMHG | RESPIRATION RATE: 16 BRPM

## 2023-10-30 PROCEDURE — 0651 HSPC ROUTINE HOME CARE

## 2023-10-30 PROCEDURE — G0299 HHS/HOSPICE OF RN EA 15 MIN: HCPCS

## 2023-10-30 ASSESSMENT — ENCOUNTER SYMPTOMS: BOWEL INCONTINENCE: 1

## 2023-10-30 NOTE — HOSPICE
Patient was in bed with family by her side. Family will be talking to MSW about getting daytime help for the patient. Anton Ramirez will continue to fellow.

## 2023-10-30 NOTE — HOSPICE
Patient/Caregiver/Famil findings/issues during SN visit:  None identified    Medication refills ordered this visit: None needed    Medications reconciled and all medications are available in the home this visit. Education was provided regarding medications, medication interactions, and look alike medications. Response to teaching. Family verbalized understanding. Medications are effective at this time. Supplies by type and quantity ordered this visit include: chux, wipes and barrier cream confirmation#[id number]    Consulted medical director/attending physician regarding:   Not at this time    Instructed patient/family/caregiver on 24-hour hospice availability and phone number.     Plan for next visit:  Continued EOL education and support

## 2023-10-31 ENCOUNTER — HOME CARE VISIT (OUTPATIENT)
Age: 88
End: 2023-10-31
Payer: MEDICARE

## 2023-10-31 PROCEDURE — G0156 HHCP-SVS OF AIDE,EA 15 MIN: HCPCS

## 2023-10-31 PROCEDURE — G0155 HHCP-SVS OF CSW,EA 15 MIN: HCPCS

## 2023-10-31 PROCEDURE — 0651 HSPC ROUTINE HOME CARE

## 2023-10-31 NOTE — HOSPICE
Reason for today's visit is to complete assessment. Pt is a 80year old female, admitted to hospice on 10-24-23 with pulmonary embolism. Pt resides in her own home and is cared for by son Jamison Fabry during the day, and sister Jolynn Green at night. Son Jamison Fabry, dtr in [facility], and friend Evalyn Claude at bedside. Evalyn Claude states Medicaid was applied for in the hospital and the UAI was completed, however, it is not approved yet. DIL states they would like a medicaid aide once approved, to assist pt's sister in the evening. MSW educates on the process and family verbalizes understanding. Home is neat and tidy, no safety concerns noted. Pt is received obtaining hygiene services by hospice aide, therefore, DIL answers most of the questions, as son has somewhat of a flat affect. LISA reports pt is bedbound and requires assistance with all ADL's. PT is able to make her needs know and is not having issues with pain, but agitation is present. LISA states medications have been effective. LISA states pt is eating \"very little,\" and MSW educates on nutrition during EOL. LISA reports that emotionally they are doing ok and have a good support system of family. LISA denies any communication barriers or relationship dynamics that need to be addressed. LISA states the goal for pt is for her to remain comfortably in her home. LISA agrees for MSW to follow each month to assess emotional support needs and provide support verbalized.

## 2023-11-01 PROCEDURE — 0651 HSPC ROUTINE HOME CARE

## 2023-11-02 ENCOUNTER — HOME CARE VISIT (OUTPATIENT)
Age: 88
End: 2023-11-02
Payer: MEDICARE

## 2023-11-02 VITALS
SYSTOLIC BLOOD PRESSURE: 98 MMHG | RESPIRATION RATE: 16 BRPM | DIASTOLIC BLOOD PRESSURE: 68 MMHG | TEMPERATURE: 99 F | OXYGEN SATURATION: 98 % | HEART RATE: 70 BPM

## 2023-11-02 PROCEDURE — G0300 HHS/HOSPICE OF LPN EA 15 MIN: HCPCS

## 2023-11-02 PROCEDURE — 0651 HSPC ROUTINE HOME CARE

## 2023-11-02 ASSESSMENT — ENCOUNTER SYMPTOMS: BOWEL INCONTINENCE: 1

## 2023-11-02 NOTE — HOSPICE
Patient/Caregiver/Family/Facility findings/issues during SN visit:      Medication refills ordered this visit: None needed: not needed    Medications reconciled and all medications are available in the home this visit. The following education was provided regarding medications, medication interactions, and look alike medications: Medication side effects, dosages, purposes, frequencies. Response to teaching: Verbalized understanding. Medications are effective at this time. Supplies by type and quantity ordered this visit include: wound care supplies, wound cleanser---Order Number : [id number]    Consulted medical director/attending physician regarding: Not needed. Instructed patient/family/caregiver on 24-hour hospice availability and phone number. Plan for next visit:  Continue end of life education and support caregiver.

## 2023-11-03 ENCOUNTER — HOME CARE VISIT (OUTPATIENT)
Age: 88
End: 2023-11-03
Payer: MEDICARE

## 2023-11-03 PROCEDURE — G0156 HHCP-SVS OF AIDE,EA 15 MIN: HCPCS

## 2023-11-03 PROCEDURE — 0651 HSPC ROUTINE HOME CARE

## 2023-11-04 PROCEDURE — 0651 HSPC ROUTINE HOME CARE

## 2023-11-05 PROCEDURE — 0651 HSPC ROUTINE HOME CARE

## 2023-11-06 ENCOUNTER — HOME CARE VISIT (OUTPATIENT)
Age: 88
End: 2023-11-06
Payer: MEDICARE

## 2023-11-06 PROCEDURE — G0299 HHS/HOSPICE OF RN EA 15 MIN: HCPCS

## 2023-11-07 ENCOUNTER — HOME CARE VISIT (OUTPATIENT)
Age: 88
End: 2023-11-07
Payer: MEDICARE

## 2023-11-07 VITALS
DIASTOLIC BLOOD PRESSURE: 57 MMHG | RESPIRATION RATE: 18 BRPM | SYSTOLIC BLOOD PRESSURE: 112 MMHG | TEMPERATURE: 96.7 F | OXYGEN SATURATION: 98 % | HEART RATE: 50 BPM

## 2023-11-07 PROCEDURE — G0156 HHCP-SVS OF AIDE,EA 15 MIN: HCPCS

## 2023-11-07 ASSESSMENT — ENCOUNTER SYMPTOMS: BOWEL INCONTINENCE: 1

## 2023-11-07 NOTE — HOSPICE
Patient/Caregiver/Family findings/issues during SN visit:  none identitfied    Medication refills ordered this visit:  none needed    Medications reconciled and all medications are available in the home this visit. Education was provided regarding medications, medication interactions, and look alike medications. Response to teaching. Son verbalized understanding. Medications are effective at this time. Supplies by type and quantity ordered this visit include:   medium briefs, barrier cream, zinc, chux, wipes and medium gloves  confirmation# [id number]    Consulted medical director/attending physician regarding:   Not at this time    Instructed patient/family/caregiver on 24-hour hospice availability and phone number.     Plan for next visit:  Continued EOL education and support

## 2023-11-09 ENCOUNTER — HOME CARE VISIT (OUTPATIENT)
Age: 88
End: 2023-11-09
Payer: MEDICARE

## 2023-11-09 VITALS
SYSTOLIC BLOOD PRESSURE: 107 MMHG | RESPIRATION RATE: 18 BRPM | HEART RATE: 69 BPM | OXYGEN SATURATION: 96 % | TEMPERATURE: 97.2 F | DIASTOLIC BLOOD PRESSURE: 63 MMHG

## 2023-11-09 PROCEDURE — G0299 HHS/HOSPICE OF RN EA 15 MIN: HCPCS

## 2023-11-09 ASSESSMENT — ENCOUNTER SYMPTOMS: BOWEL INCONTINENCE: 1

## 2023-11-09 NOTE — HOSPICE
Patient/Caregiver/Family findings/issues during SN visit:  None identified    Medication refills ordered this visit: None needed    Medications reconciled and all medications are available in the home this visit. Education was provided regarding medications, medication interactions, and look alike medications. Response to teaching. Family verbalized understanding. Medications are effective at this time. Supplies by type and quantity ordered this visit include: None needed    Consulted medical director/attending physician regarding: Not at this time    Instructed patient/family/caregiver on 24-hour hospice availability and phone number.     Plan for next visit:  Continued EOL education and support

## 2023-11-10 ENCOUNTER — HOME CARE VISIT (OUTPATIENT)
Age: 88
End: 2023-11-10
Payer: MEDICARE

## 2023-11-10 PROCEDURE — G0156 HHCP-SVS OF AIDE,EA 15 MIN: HCPCS

## 2023-11-13 ENCOUNTER — HOME CARE VISIT (OUTPATIENT)
Age: 88
End: 2023-11-13
Payer: MEDICARE

## 2023-11-13 PROCEDURE — G0300 HHS/HOSPICE OF LPN EA 15 MIN: HCPCS

## 2023-11-14 ENCOUNTER — HOME CARE VISIT (OUTPATIENT)
Age: 88
End: 2023-11-14
Payer: MEDICARE

## 2023-11-14 VITALS
DIASTOLIC BLOOD PRESSURE: 66 MMHG | SYSTOLIC BLOOD PRESSURE: 108 MMHG | OXYGEN SATURATION: 99 % | RESPIRATION RATE: 18 BRPM | HEART RATE: 78 BPM

## 2023-11-14 PROCEDURE — G0156 HHCP-SVS OF AIDE,EA 15 MIN: HCPCS

## 2023-11-14 ASSESSMENT — ENCOUNTER SYMPTOMS: BOWEL INCONTINENCE: 1

## 2023-11-14 NOTE — HOSPICE
Patient/Caregiver/Family/Facility findings/issues during SN visit:  No new concerns per caregiver    Medication refills ordered this visit:No refills needed    Medications reconciled and all medications are available in the home this visit. The following education was provided regarding medications, medication interactions, and look alike medications: Medication side effects, dosages, purposes, frequencies. Response to teaching: Verbalized understanding. Medications are effective at this time. Supplies by type and quantity ordered this visit include: No supplies needed    Consulted medical director/attending physician regarding: Not needed    Instructed patient/family/caregiver on 24-hour hospice availability and phone number. Plan for next visit:  Continue end of life education and support caregiver.

## 2023-11-15 ENCOUNTER — HOME CARE VISIT (OUTPATIENT)
Age: 88
End: 2023-11-15
Payer: MEDICARE

## 2023-11-15 PROCEDURE — G0155 HHCP-SVS OF CSW,EA 15 MIN: HCPCS

## 2023-11-16 ENCOUNTER — HOME CARE VISIT (OUTPATIENT)
Age: 88
End: 2023-11-16
Payer: MEDICARE

## 2023-11-16 PROCEDURE — G0299 HHS/HOSPICE OF RN EA 15 MIN: HCPCS

## 2023-11-17 ENCOUNTER — HOME CARE VISIT (OUTPATIENT)
Age: 88
End: 2023-11-17
Payer: MEDICARE

## 2023-11-17 VITALS
SYSTOLIC BLOOD PRESSURE: 136 MMHG | HEART RATE: 53 BPM | DIASTOLIC BLOOD PRESSURE: 78 MMHG | TEMPERATURE: 97.6 F | OXYGEN SATURATION: 98 % | RESPIRATION RATE: 18 BRPM

## 2023-11-17 PROCEDURE — G0156 HHCP-SVS OF AIDE,EA 15 MIN: HCPCS

## 2023-11-17 ASSESSMENT — ENCOUNTER SYMPTOMS: BOWEL INCONTINENCE: 1
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
## 2023-11-20 ENCOUNTER — HOME CARE VISIT (OUTPATIENT)
Age: 88
End: 2023-11-20
Payer: MEDICARE

## 2023-11-20 PROCEDURE — G0299 HHS/HOSPICE OF RN EA 15 MIN: HCPCS

## 2023-11-21 ENCOUNTER — HOME CARE VISIT (OUTPATIENT)
Age: 88
End: 2023-11-21
Payer: MEDICARE

## 2023-11-21 VITALS
RESPIRATION RATE: 16 BRPM | DIASTOLIC BLOOD PRESSURE: 62 MMHG | SYSTOLIC BLOOD PRESSURE: 98 MMHG | TEMPERATURE: 96.5 F | HEART RATE: 78 BPM | OXYGEN SATURATION: 98 %

## 2023-11-21 PROCEDURE — G0156 HHCP-SVS OF AIDE,EA 15 MIN: HCPCS

## 2023-11-21 ASSESSMENT — ENCOUNTER SYMPTOMS: BOWEL INCONTINENCE: 1

## 2023-11-21 NOTE — HOSPICE
Patient was in bed and her son was right by her side. The daughter in law is also there helping. The sister was there overnight but has left and now her son stays during the night. Jayne Mansfield will continue to follow.

## 2023-11-21 NOTE — HOSPICE
Patient/Caregiver/Family findings/issues during SN visit:  None identified    Medication refills ordered this visit: None needed    Medications reconciled and all medications are available in the home this visit. Education was provided regarding medications, medication interactions, and look alike medications. Response to teaching. Patient and family verbalized understanding. Medications are effective at this time. Supplies by type and quantity ordered this visit include: None needed    Consulted medical director/attending physician regarding: Not at this time    Instructed patient/family/caregiver on 24-hour hospice availability and phone number.     Plan for next visit:  Continued EOL education and support

## 2023-11-24 ENCOUNTER — HOME CARE VISIT (OUTPATIENT)
Age: 88
End: 2023-11-24
Payer: MEDICARE

## 2023-11-24 VITALS — TEMPERATURE: 98.2 F | RESPIRATION RATE: 16 BRPM | OXYGEN SATURATION: 95 %

## 2023-11-24 PROCEDURE — G0156 HHCP-SVS OF AIDE,EA 15 MIN: HCPCS

## 2023-11-24 PROCEDURE — G0299 HHS/HOSPICE OF RN EA 15 MIN: HCPCS

## 2023-11-24 NOTE — HOSPICE
Patient/Caregiver/Family/Facility findings/issues during SN visit:  no issues at this time    Medication refills ordered this visit: MIGUEL    Medications reconciled and all medications are available in the home this visit. The following education was provided regarding medications, medication interactions, and look alike medications: . Response to teaching: fair. Medications are effective at this time. Supplies by type and quantity ordered this visit include: MIGUEL    Consulted medical director/attending physician regarding: MIGUEL    Instructed patient/family/caregiver on 24-hour hospice availability and phone number.     Plan for next visit:  assessment, symptom management

## 2023-11-27 ENCOUNTER — HOME CARE VISIT (OUTPATIENT)
Age: 88
End: 2023-11-27
Payer: MEDICARE

## 2023-11-27 VITALS
SYSTOLIC BLOOD PRESSURE: 98 MMHG | OXYGEN SATURATION: 97 % | DIASTOLIC BLOOD PRESSURE: 62 MMHG | RESPIRATION RATE: 18 BRPM | TEMPERATURE: 97.6 F | HEART RATE: 80 BPM

## 2023-11-27 PROCEDURE — G0299 HHS/HOSPICE OF RN EA 15 MIN: HCPCS

## 2023-11-27 ASSESSMENT — ENCOUNTER SYMPTOMS: BOWEL INCONTINENCE: 1

## 2023-11-27 NOTE — HOSPICE
Patient/Caregiver/Family findings/issues during SN visit:  None identfied    Medication refills ordered this visit:  None needed    Medications reconciled and all medications are available in the home this visit. Education was provided regarding medications, medication interactions, and look alike medications. Response to teaching. Family verbalized understanding. Medications are effective at this time. Family instructed not to give patient her BP medications as her pressure was low. Supplies by type and quantity ordered this visit include:  None needed    Consulted medical director/attending physician regarding: Not at this time    Instructed patient/family/caregiver on 24-hour hospice availability and phone number.     Plan for next visit:  Continued EOL education and support

## 2023-11-28 ENCOUNTER — HOME CARE VISIT (OUTPATIENT)
Age: 88
End: 2023-11-28
Payer: MEDICARE

## 2023-11-28 PROCEDURE — G0156 HHCP-SVS OF AIDE,EA 15 MIN: HCPCS

## 2023-11-30 ENCOUNTER — HOME CARE VISIT (OUTPATIENT)
Age: 88
End: 2023-11-30
Payer: MEDICARE

## 2023-11-30 PROCEDURE — G0300 HHS/HOSPICE OF LPN EA 15 MIN: HCPCS

## 2023-12-01 ENCOUNTER — HOME CARE VISIT (OUTPATIENT)
Age: 88
End: 2023-12-01
Payer: MEDICARE

## 2023-12-01 VITALS
HEART RATE: 63 BPM | TEMPERATURE: 98.4 F | SYSTOLIC BLOOD PRESSURE: 100 MMHG | DIASTOLIC BLOOD PRESSURE: 62 MMHG | RESPIRATION RATE: 18 BRPM | OXYGEN SATURATION: 98 %

## 2023-12-01 PROCEDURE — G0156 HHCP-SVS OF AIDE,EA 15 MIN: HCPCS

## 2023-12-01 ASSESSMENT — ENCOUNTER SYMPTOMS: BOWEL INCONTINENCE: 1

## 2023-12-01 NOTE — HOSPICE
Patient/Caregiver/Family/Facility findings/issues during SN visit:  No new concerns per caregivers or patient    Medication refills ordered this visit: None needed    Medications reconciled and all medications are available in the home this visit. The following education was provided regarding medications, medication interactions, and look alike medications: Medication side effects, dosages, purposes, frequencies. Response to teaching: Verbalized understanding. Medications are effective at this time. Supplies by type and quantity ordered this visit include: Not needed    Consulted medical director/attending physician regarding: Not needed    Instructed patient/family/caregiver on 24-hour hospice availability and phone number. Plan for next visit:  Continue end of life education and support caregiver.

## 2023-12-04 ENCOUNTER — HOME CARE VISIT (OUTPATIENT)
Age: 88
End: 2023-12-04
Payer: MEDICARE

## 2023-12-04 VITALS
DIASTOLIC BLOOD PRESSURE: 56 MMHG | HEART RATE: 60 BPM | TEMPERATURE: 98.5 F | SYSTOLIC BLOOD PRESSURE: 98 MMHG | RESPIRATION RATE: 18 BRPM | OXYGEN SATURATION: 99 %

## 2023-12-04 PROCEDURE — G0299 HHS/HOSPICE OF RN EA 15 MIN: HCPCS

## 2023-12-04 NOTE — HOSPICE
Patient/Caregiver/Family/Facility findings/issues during SN visit:  received pt in her apartment in hospital bed. Son Noah Randall and LISA Hansen present. Pt awake and alert. Denies pain but stated \"I have anxiety attacks sometimes\" Rupa Loving LPN educated Karthik Hansen on Lorazepam. Zulema verbalized understanding. lungs diminished throughout, SpO2 99% on RA. positive bowel sounds, BM yesterday, escoriation to sacral area. good appetite per Karthik Hansen, pt stated \"I love cookies\". +pulses, no edema, no falls, no infections. Reinforced 24 HR number to call with questions or concerns. Medication refills ordered this visit: none needed per Zulema    Medications reconciled and all medications are/are not available in the home this visit. The following education was provided regarding medications, medication interactions, and look alike medications: Lorazepam uses and directions. Response to teaching: Karthik Hansen verbalized understanding. Medications are effective/not effective at this time. Supplies by type and quantity ordered this visit include: none needed    Consulted medical director/attending physician regarding: N/A    Instructed patient/family/caregiver on 24-hour hospice availability and phone number.     Plan for next visit:  monitor anxiety, monitor affects of Lorazepam, monitor skin

## 2023-12-05 ENCOUNTER — HOME CARE VISIT (OUTPATIENT)
Age: 88
End: 2023-12-05
Payer: MEDICARE

## 2023-12-05 PROCEDURE — G0156 HHCP-SVS OF AIDE,EA 15 MIN: HCPCS

## 2023-12-07 ENCOUNTER — HOME CARE VISIT (OUTPATIENT)
Age: 88
End: 2023-12-07
Payer: MEDICARE

## 2023-12-07 VITALS
SYSTOLIC BLOOD PRESSURE: 97 MMHG | TEMPERATURE: 91.2 F | HEART RATE: 62 BPM | DIASTOLIC BLOOD PRESSURE: 62 MMHG | RESPIRATION RATE: 16 BRPM | OXYGEN SATURATION: 92 %

## 2023-12-07 PROCEDURE — G0299 HHS/HOSPICE OF RN EA 15 MIN: HCPCS

## 2023-12-07 NOTE — HOSPICE
Patient/Caregiver/Family/Facility findings/issues during SN visit:  n/a    Medication refills ordered this visit: n/a    Medications reconciled and all medications are available in the home this visit. The following education was provided regarding medications, medication interactions, and look alike medications. Response to teaching: caregiver verbalized understanding. Medications are effective at this time. Supplies by type and quantity ordered this visit include: barrier cream, medium gloves, medium breifs, wipes, carmelo pads. confimation number: [id number]    Consulted medical director/attending physician regarding: n/a    Instructed patient/family/caregiver on 24-hour hospice availability and phone number.

## 2023-12-08 ENCOUNTER — HOME CARE VISIT (OUTPATIENT)
Age: 88
End: 2023-12-08
Payer: MEDICARE

## 2023-12-08 PROCEDURE — G0156 HHCP-SVS OF AIDE,EA 15 MIN: HCPCS

## 2023-12-11 ENCOUNTER — HOME CARE VISIT (OUTPATIENT)
Age: 88
End: 2023-12-11
Payer: MEDICARE

## 2023-12-11 PROCEDURE — G0300 HHS/HOSPICE OF LPN EA 15 MIN: HCPCS

## 2023-12-12 ENCOUNTER — HOME CARE VISIT (OUTPATIENT)
Age: 88
End: 2023-12-12
Payer: MEDICARE

## 2023-12-12 VITALS
TEMPERATURE: 98.8 F | SYSTOLIC BLOOD PRESSURE: 103 MMHG | RESPIRATION RATE: 18 BRPM | HEART RATE: 67 BPM | OXYGEN SATURATION: 98 % | DIASTOLIC BLOOD PRESSURE: 67 MMHG

## 2023-12-12 PROCEDURE — G0156 HHCP-SVS OF AIDE,EA 15 MIN: HCPCS

## 2023-12-13 NOTE — HOSPICE
Patient/Caregiver/Family/Facility findings/issues during SN visit:  No new concerns per family    Medication refills ordered this visit: Not needed    Medications reconciled and all medications are available in the home this visit. The following education was provided regarding medications, medication interactions, and look alike medications: Medication side effects, dosages, purposes, frequencies. Response to teaching: Verbalized understanding. Medications are effective at this time. Supplies by type and quantity ordered this visit include: Not needed    Consulted medical director/attending physician regarding: Not needed    Instructed patient/family/caregiver on 24-hour hospice availability and phone number. Plan for next visit:  Continue end of life education and support caregiver.

## 2023-12-14 ENCOUNTER — HOME CARE VISIT (OUTPATIENT)
Age: 88
End: 2023-12-14
Payer: MEDICARE

## 2023-12-14 PROCEDURE — G0300 HHS/HOSPICE OF LPN EA 15 MIN: HCPCS

## 2023-12-15 VITALS
OXYGEN SATURATION: 96 % | TEMPERATURE: 98.7 F | DIASTOLIC BLOOD PRESSURE: 63 MMHG | SYSTOLIC BLOOD PRESSURE: 119 MMHG | HEART RATE: 58 BPM | RESPIRATION RATE: 18 BRPM

## 2023-12-15 NOTE — HOSPICE
Patient/Caregiver/Family/Facility findings/issues during SN visit:  No new concerns    Medication refills ordered this visit: Not needed    Medications reconciled and all medications are available in the home this visit. The following education was provided regarding medications, medication interactions, and look alike medications: Medication side effects, dosages, purposes, frequencies. Response to teaching: Verbalized understanding. Medications are effective at this time. Supplies by type and quantity ordered this visit include: Wedges, chux, medium gloves, mouth moisturizer, lemon glycerine---Order Number : [id number]    Consulted medical director/attending physician regarding: Not needed    Instructed patient/family/caregiver on 24-hour hospice availability and phone number. Plan for next visit:  Continue end of life education and support caregiver.

## 2023-12-27 ENCOUNTER — HOME CARE VISIT (OUTPATIENT)
Age: 88
End: 2023-12-27
Payer: MEDICARE

## 2023-12-27 VITALS
OXYGEN SATURATION: 96 % | TEMPERATURE: 98.5 F | HEART RATE: 70 BPM | RESPIRATION RATE: 16 BRPM | SYSTOLIC BLOOD PRESSURE: 95 MMHG | DIASTOLIC BLOOD PRESSURE: 64 MMHG

## 2023-12-27 PROCEDURE — G0300 HHS/HOSPICE OF LPN EA 15 MIN: HCPCS

## 2023-12-27 PROCEDURE — G0299 HHS/HOSPICE OF RN EA 15 MIN: HCPCS

## 2023-12-27 PROCEDURE — G0156 HHCP-SVS OF AIDE,EA 15 MIN: HCPCS

## 2023-12-27 NOTE — HOSPICE
Patient/Caregiver/Family/Facility findings/issues during SN visit: No new concerns    Medication refills ordered this visit: Not needed    Medications reconciled and all medications are available in the home this visit. The following education was provided regarding medications, medication interactions, and look alike medications: Medication side effects, dosages, purposes, frequencies. Response to teaching: Verbalized understanding. Medications are effective at this time. Supplies by type and quantity ordered this visit include: Not needed    Consulted medical director/attending physician regarding: Not needed     Instructed patient/family/caregiver on 24-hour hospice availability and phone number. Plan for next visit:  Continue end of life education and support caregiver.

## 2023-12-28 ENCOUNTER — HOME CARE VISIT (OUTPATIENT)
Age: 88
End: 2023-12-28
Payer: MEDICARE

## 2023-12-28 PROCEDURE — G0155 HHCP-SVS OF CSW,EA 15 MIN: HCPCS

## 2023-12-28 NOTE — HOSPICE
Reason for today's visit is to assess supportive needs and if medicaid is approved. MSW speaks to son for visit and son denies needing a face to face visit and speaks to MSW via phone. Son reports he and family are doing well overall and denies any supportive needs at this time. Son states pt is doing as well as expected at this time and denies any needs for pt. Son reports that he is not certain of the status of the Medicaid and asks MSW to speak with caregiver Shanna Gilford. MSW places call to Shanna Gilford and receives no answer. MSW will obtain info upon next visit since son states there are no caregiving issues at this time. Son agrees for MSW to continue to follow each month.

## 2023-12-29 ENCOUNTER — HOME CARE VISIT (OUTPATIENT)
Age: 88
End: 2023-12-29
Payer: MEDICARE

## 2023-12-29 PROCEDURE — G0156 HHCP-SVS OF AIDE,EA 15 MIN: HCPCS

## 2024-01-01 ENCOUNTER — HOME CARE VISIT (OUTPATIENT)
Age: 89
End: 2024-01-01
Payer: MEDICARE

## 2024-01-01 VITALS
HEART RATE: 62 BPM | RESPIRATION RATE: 14 BRPM | SYSTOLIC BLOOD PRESSURE: 123 MMHG | DIASTOLIC BLOOD PRESSURE: 62 MMHG | TEMPERATURE: 98.3 F | OXYGEN SATURATION: 93 %

## 2024-01-01 PROCEDURE — G0299 HHS/HOSPICE OF RN EA 15 MIN: HCPCS

## 2024-01-03 ENCOUNTER — HOME CARE VISIT (OUTPATIENT)
Age: 89
End: 2024-01-03
Payer: MEDICARE

## 2024-01-03 VITALS
HEART RATE: 89 BPM | TEMPERATURE: 98.1 F | RESPIRATION RATE: 17 BRPM | DIASTOLIC BLOOD PRESSURE: 59 MMHG | SYSTOLIC BLOOD PRESSURE: 130 MMHG | OXYGEN SATURATION: 97 %

## 2024-01-03 PROCEDURE — G0299 HHS/HOSPICE OF RN EA 15 MIN: HCPCS

## 2024-01-03 PROCEDURE — G0156 HHCP-SVS OF AIDE,EA 15 MIN: HCPCS

## 2024-01-03 NOTE — HOSPICE
Patient/Caregiver/Family/Facility findings/issues during SN visit:  n/a    Medication refills ordered this visit: none needed    Medications reconciled and all medications are available in the home this visit.  The following education was provided regarding medications, medication interactions, and look alike medications.  Response to teaching caregiver verbalized understanding. Medications are effective at this time.      Supplies by type and quantity ordered this visit include: confirmation number:n/a  carmelo pads, moisturizer, lip moisturizer, vanilla nutritional shake, medium gloves, wipes    Consulted medical director/attending physician regarding: n/a    Instructed patient/family/caregiver on 24-hour hospice availability and phone number.    Plan for next visit:  follow up visit    caregiver report pt is not taking off clothes with agitation and lorazepammis effective.

## 2024-01-05 ENCOUNTER — HOME CARE VISIT (OUTPATIENT)
Age: 89
End: 2024-01-05
Payer: MEDICARE

## 2024-01-05 PROCEDURE — G0156 HHCP-SVS OF AIDE,EA 15 MIN: HCPCS

## 2024-01-05 PROCEDURE — G0300 HHS/HOSPICE OF LPN EA 15 MIN: HCPCS

## 2024-01-06 VITALS
SYSTOLIC BLOOD PRESSURE: 117 MMHG | OXYGEN SATURATION: 97 % | DIASTOLIC BLOOD PRESSURE: 53 MMHG | RESPIRATION RATE: 16 BRPM | HEART RATE: 55 BPM | TEMPERATURE: 98.9 F

## 2024-01-06 NOTE — HOSPICE
Patient/Caregiver/Family/Facility findings/issues during SN visit:  No new concerns.    Medication refills ordered this visit: Not needed    Medications reconciled and all medications are available in the home this visit.  The following education was provided regarding medications, medication interactions, and look alike medications: Medication side effects, dosages, purposes, frequencies.  Response to teaching: Verbalized understanding. Medications are effective at this time.      Supplies by type and quantity ordered this visit include: Vanilla Ensure, purple top lotion--check previous order    Consulted medical director/attending physician regarding: Not needed this visit    Instructed patient/family/caregiver on 24-hour hospice availability and phone number.    Plan for next visit:  Continue end of life education and support caregiver.

## 2024-01-08 ENCOUNTER — HOME CARE VISIT (OUTPATIENT)
Age: 89
End: 2024-01-08
Payer: MEDICARE

## 2024-01-08 PROCEDURE — G0300 HHS/HOSPICE OF LPN EA 15 MIN: HCPCS

## 2024-01-09 ENCOUNTER — HOME CARE VISIT (OUTPATIENT)
Age: 89
End: 2024-01-09
Payer: MEDICARE

## 2024-01-09 VITALS
TEMPERATURE: 98.6 F | SYSTOLIC BLOOD PRESSURE: 114 MMHG | OXYGEN SATURATION: 96 % | RESPIRATION RATE: 16 BRPM | DIASTOLIC BLOOD PRESSURE: 60 MMHG | HEART RATE: 55 BPM

## 2024-01-09 PROCEDURE — G0156 HHCP-SVS OF AIDE,EA 15 MIN: HCPCS

## 2024-01-09 NOTE — HOSPICE
Patient/Caregiver/Family/Facility findings/issues during SN visit:  No new concerns per family.  Symptoms are well managed at this time.    Medication refills ordered this visit: Not needed    Medications reconciled and all medications are available in the home this visit.  The following education was provided regarding medications, medication interactions, and look alike medications: Medication side effects, dosages, purposes, frequencies.  Response to teaching: Verbalized understanding. Medications are effective at this time.      Supplies by type and quantity ordered this visit include: No supplies needed    Consulted medical director/attending physician regarding: Not needed    Instructed patient/family/caregiver on 24-hour hospice availability and phone number.    Plan for next visit:  Continue end of life education and support caregiver.

## 2024-01-11 ENCOUNTER — HOME CARE VISIT (OUTPATIENT)
Age: 89
End: 2024-01-11
Payer: MEDICARE

## 2024-01-11 VITALS
RESPIRATION RATE: 16 BRPM | HEART RATE: 71 BPM | TEMPERATURE: 97.5 F | DIASTOLIC BLOOD PRESSURE: 77 MMHG | SYSTOLIC BLOOD PRESSURE: 120 MMHG | OXYGEN SATURATION: 95 %

## 2024-01-11 PROCEDURE — G0299 HHS/HOSPICE OF RN EA 15 MIN: HCPCS

## 2024-01-11 ASSESSMENT — ENCOUNTER SYMPTOMS: CONSTIPATION: 1

## 2024-01-11 NOTE — HOSPICE
Patient/Caregiver/Family/Facility findings/issues during SN visit:  n/a    Medication refills ordered this visit: haldol Confirmation #: [id number]    Medications reconciled and all medications areavailable in the home this visit.  The following education was provided regarding medications, medication interactions, and look alike medications.  Response to teaching: . Ting vebalized understanding. medications are effective at this time.      Supplies by type and quantity ordered this visit include: barrier cream, wipes, medium briefs, gloves Order Number : [id number]    Consulted medical director/attending physician regarding: n/a    Instructed patient/family/caregiver on 24-hour hospice availability and phone number.    Plan for next visit: follow up

## 2024-01-12 ENCOUNTER — HOME CARE VISIT (OUTPATIENT)
Age: 89
End: 2024-01-12
Payer: MEDICARE

## 2024-01-12 PROCEDURE — G0156 HHCP-SVS OF AIDE,EA 15 MIN: HCPCS

## 2024-01-15 ENCOUNTER — HOME CARE VISIT (OUTPATIENT)
Age: 89
End: 2024-01-15
Payer: MEDICARE

## 2024-01-15 VITALS
TEMPERATURE: 97.9 F | HEART RATE: 51 BPM | RESPIRATION RATE: 16 BRPM | DIASTOLIC BLOOD PRESSURE: 57 MMHG | SYSTOLIC BLOOD PRESSURE: 91 MMHG | OXYGEN SATURATION: 96 %

## 2024-01-15 PROCEDURE — G0300 HHS/HOSPICE OF LPN EA 15 MIN: HCPCS

## 2024-01-15 NOTE — HOSPICE
Patient/Caregiver/Family/Facility findings/issues during SN visit:  No new concerns this visit    Medication refills ordered this visit: Not needed this visit    Medications reconciled and all medications are available in the home this visit.  The following education was provided regarding medications, medication interactions, and look alike medications: Medication side effects, dosages, purposes, frequencies.  Response to teaching: Verbalized understanding. Medications are effective at this time.      Supplies by type and quantity ordered this visit include: Not needed    Consulted medical director/attending physician regarding: Not needed this visit    Instructed patient/family/caregiver on 24-hour hospice availability and phone number.    Plan for next visit:  Continue end of life education and support caregiver.

## 2024-01-16 ENCOUNTER — HOME CARE VISIT (OUTPATIENT)
Age: 89
End: 2024-01-16
Payer: MEDICARE

## 2024-01-16 PROCEDURE — G0156 HHCP-SVS OF AIDE,EA 15 MIN: HCPCS

## 2024-01-17 ENCOUNTER — HOME CARE VISIT (OUTPATIENT)
Age: 89
End: 2024-01-17
Payer: MEDICARE

## 2024-01-17 PROCEDURE — G0155 HHCP-SVS OF CSW,EA 15 MIN: HCPCS

## 2024-01-18 ENCOUNTER — HOME CARE VISIT (OUTPATIENT)
Age: 89
End: 2024-01-18
Payer: MEDICARE

## 2024-01-18 VITALS
TEMPERATURE: 97.2 F | DIASTOLIC BLOOD PRESSURE: 69 MMHG | OXYGEN SATURATION: 98 % | HEART RATE: 59 BPM | SYSTOLIC BLOOD PRESSURE: 136 MMHG | RESPIRATION RATE: 18 BRPM

## 2024-01-18 PROCEDURE — G0299 HHS/HOSPICE OF RN EA 15 MIN: HCPCS

## 2024-01-18 PROCEDURE — G0156 HHCP-SVS OF AIDE,EA 15 MIN: HCPCS

## 2024-01-18 ASSESSMENT — ENCOUNTER SYMPTOMS: BOWEL INCONTINENCE: 1

## 2024-01-18 NOTE — HOSPICE
Patient/Caregiver/Family findings/issues during SN visit:   None identified    Medication refills ordered this visit:  None needed    Medications reconciled and all medications are available in the home this visit.  Education was provided regarding medications, medication interactions, and look alike medications.   Response to teaching.  SonCampbell verbalized understanding.  Medications are effective at this time.      Supplies by type and quantity ordered this visit include: medium briefs, chux, wipes, barrier, calazime and mouth swabs  confirmation#[id number]    Consulted medical director/attending physician regarding: Not at this time    Instructed patient/family/caregiver on 24-hour hospice availability and phone number.    Plan for next visit:  Continued EOL education and support

## 2024-01-18 NOTE — HOSPICE
Reason for today's visit is to assess medicaid status and emotional support needs. MSW places call and speaks to Jazzy per sons request. Jazzy states that pt's medicaid was denied and son, dtr in law, and herself continue to provide 24 hour care. Jazzy reports that the family is ok with this as pt is cared for appropriately and 24 hours per day. Jazzy reports that no face to face is needed at this time. Jazzy states pt is eating less and sleeping more, but remains comfortable at this time. Jazzy speaks to medicaid and the disappointment she has in it. MSW provides active listening and support. Jazzy agrees for MSW to continue to follow each month to assess supportive needs.

## 2024-01-22 ENCOUNTER — HOME CARE VISIT (OUTPATIENT)
Age: 89
End: 2024-01-22
Payer: MEDICARE

## 2024-01-22 VITALS
HEART RATE: 61 BPM | OXYGEN SATURATION: 98 % | TEMPERATURE: 98.8 F | SYSTOLIC BLOOD PRESSURE: 136 MMHG | RESPIRATION RATE: 16 BRPM | DIASTOLIC BLOOD PRESSURE: 68 MMHG

## 2024-01-22 PROCEDURE — G0300 HHS/HOSPICE OF LPN EA 15 MIN: HCPCS

## 2024-01-22 ASSESSMENT — ENCOUNTER SYMPTOMS: BOWEL INCONTINENCE: 1

## 2024-01-22 NOTE — HOSPICE
Patient/Caregiver/Family/Facility findings/issues during SN visit:  No new concerns at this time    Medication refills ordered this visit:Not needed    Medications reconciled and all medications are available in the home this visit.  The following education was provided regarding medications, medication interactions, and look alike medications: Medication side effects, dosages, purposes, frequencies.  Response to teaching: Verbalized understanding. Medications are effective at this time.      Supplies by type and quantity ordered this visit include: Ensure vanilla---Confirm # [id number]    Consulted medical director/attending physician regarding: Not needed this visit    Instructed patient/family/caregiver on 24-hour hospice availability and phone number.    Plan for next visit:  Continue end of life education and support caregiver.

## 2024-01-23 ENCOUNTER — HOME CARE VISIT (OUTPATIENT)
Age: 89
End: 2024-01-23
Payer: MEDICARE

## 2024-01-23 PROCEDURE — G0156 HHCP-SVS OF AIDE,EA 15 MIN: HCPCS

## 2024-01-25 ENCOUNTER — HOME CARE VISIT (OUTPATIENT)
Age: 89
End: 2024-01-25
Payer: MEDICARE

## 2024-01-25 VITALS
SYSTOLIC BLOOD PRESSURE: 103 MMHG | RESPIRATION RATE: 16 BRPM | HEART RATE: 58 BPM | OXYGEN SATURATION: 98 % | TEMPERATURE: 97 F | DIASTOLIC BLOOD PRESSURE: 63 MMHG

## 2024-01-25 PROCEDURE — G0300 HHS/HOSPICE OF LPN EA 15 MIN: HCPCS

## 2024-01-25 ASSESSMENT — ENCOUNTER SYMPTOMS: BOWEL INCONTINENCE: 1

## 2024-01-25 NOTE — HOSPICE
Patient/Caregiver/Family/Facility findings/issues during SN visit:  NO new concerns at this time.    Medication refills ordered this visit:Not needed    Medications reconciled and all medications are available in the home this visit.  The following education was provided regarding medications, medication interactions, and look alike medications: Medication side effects, dosages, purposes, frequencies.  Response to teaching: Verbalized understanding. Medications are effective at this time.      Supplies by type and quantity ordered this visit include: Just recieved supplies    Consulted medical director/attending physician regarding: Not needed    Instructed patient/family/caregiver on 24-hour hospice availability and phone number.    Plan for next visit:  Continue end of life education and support caregiver.

## 2024-01-26 ENCOUNTER — HOME CARE VISIT (OUTPATIENT)
Age: 89
End: 2024-01-26
Payer: MEDICARE

## 2024-01-26 PROCEDURE — G0156 HHCP-SVS OF AIDE,EA 15 MIN: HCPCS

## 2024-01-29 ENCOUNTER — HOME CARE VISIT (OUTPATIENT)
Age: 89
End: 2024-01-29
Payer: MEDICARE

## 2024-01-29 VITALS
SYSTOLIC BLOOD PRESSURE: 123 MMHG | HEART RATE: 50 BPM | TEMPERATURE: 98.9 F | DIASTOLIC BLOOD PRESSURE: 64 MMHG | OXYGEN SATURATION: 98 % | RESPIRATION RATE: 18 BRPM

## 2024-01-29 PROCEDURE — G0299 HHS/HOSPICE OF RN EA 15 MIN: HCPCS

## 2024-01-29 ASSESSMENT — ENCOUNTER SYMPTOMS: BOWEL INCONTINENCE: 1

## 2024-01-29 NOTE — HOSPICE
Patient/Caregiver/Family findings/issues during SN visit:  None identified    Medication refills ordered this visit:   None needed    Medications reconciled and all medications are available in the home this visit.  Education was provided regarding medications, medication interactions, and look alike medications.  Response to teaching.  DIL and son verbalizes understanding.   Medications are effective at this time.      Supplies by type and quantity ordered this visit include: chux, wipes and barrier cream ordered.  confirmation# [id number]    Consulted medical director/attending physician regarding:  Not at this time    Instructed patient/family/caregiver on 24-hour hospice availability and phone number.    Plan for next visit:  Continued EOL education and support

## 2024-01-30 ENCOUNTER — HOME CARE VISIT (OUTPATIENT)
Age: 89
End: 2024-01-30
Payer: MEDICARE

## 2024-01-30 PROCEDURE — G0156 HHCP-SVS OF AIDE,EA 15 MIN: HCPCS

## 2024-02-01 ENCOUNTER — HOME CARE VISIT (OUTPATIENT)
Age: 89
End: 2024-02-01
Payer: MEDICARE

## 2024-02-01 VITALS
RESPIRATION RATE: 18 BRPM | OXYGEN SATURATION: 96 % | TEMPERATURE: 98.9 F | DIASTOLIC BLOOD PRESSURE: 61 MMHG | SYSTOLIC BLOOD PRESSURE: 120 MMHG | HEART RATE: 62 BPM

## 2024-02-01 PROCEDURE — G0300 HHS/HOSPICE OF LPN EA 15 MIN: HCPCS

## 2024-02-01 NOTE — HOSPICE
Patient/Caregiver/Family/Facility findings/issues during SN visit:  No new concerns at this time.  Patient is resting quietly in bed with eyes closed.      Medication refills ordered this visit:Morphine Confirmation #[id number]    Medications reconciled and all medications are available in the home this visit.  The following education was provided regarding medications, medication interactions, and look alike medications: Medication side effects, dosages, purposes, frequencies.  Response to teaching: Verbalized understanding. Medications are effective at this time.      Supplies by type and quantity ordered this visit include: None    Consulted medical director/attending physician regarding: Not needed    Instructed patient/family/caregiver on 24-hour hospice availability and phone number.    Plan for next visit:  Continue end of life education and support caregiver.

## 2024-02-02 ENCOUNTER — HOME CARE VISIT (OUTPATIENT)
Age: 89
End: 2024-02-02
Payer: MEDICARE

## 2024-02-02 PROCEDURE — G0156 HHCP-SVS OF AIDE,EA 15 MIN: HCPCS

## 2024-02-05 ENCOUNTER — HOME CARE VISIT (OUTPATIENT)
Age: 89
End: 2024-02-05
Payer: MEDICARE

## 2024-02-05 PROCEDURE — G0300 HHS/HOSPICE OF LPN EA 15 MIN: HCPCS

## 2024-02-06 ENCOUNTER — HOME CARE VISIT (OUTPATIENT)
Age: 89
End: 2024-02-06
Payer: MEDICARE

## 2024-02-06 VITALS
RESPIRATION RATE: 16 BRPM | SYSTOLIC BLOOD PRESSURE: 103 MMHG | TEMPERATURE: 98.9 F | HEART RATE: 55 BPM | OXYGEN SATURATION: 98 % | DIASTOLIC BLOOD PRESSURE: 62 MMHG

## 2024-02-06 PROCEDURE — G0156 HHCP-SVS OF AIDE,EA 15 MIN: HCPCS

## 2024-02-06 NOTE — HOSPICE
Patient/Caregiver/Family/Facility findings/issues during SN visit:  No  new concerns per caregiver.  Pt hasnt had a BM since 2/1/23 but abdomen is soft and nontender.  No complaints of pain this visit.  Advised to give Milk of Magnesia daily until pt has a BM.    Medication refills ordered this visit: Not needed    Medications reconciled and all medications are available in the home this visit.  The following education was provided regarding medications, medication interactions, and look alike medications: Medication side effects, dosages, purposes, frequencies.  Response to teaching: Verbalized understanding. Medications are effective at this time.      Supplies by type and quantity ordered this visit include: Not needed    Consulted medical director/attending physician regarding: Not needed    Instructed patient/family/caregiver on 24-hour hospice availability and phone number.    Plan for next visit:  Continue end of life education and support caregiver.

## 2024-02-08 ENCOUNTER — HOME CARE VISIT (OUTPATIENT)
Age: 89
End: 2024-02-08
Payer: MEDICARE

## 2024-02-08 VITALS
SYSTOLIC BLOOD PRESSURE: 107 MMHG | RESPIRATION RATE: 17 BRPM | TEMPERATURE: 100.7 F | DIASTOLIC BLOOD PRESSURE: 65 MMHG | HEART RATE: 65 BPM | OXYGEN SATURATION: 97 %

## 2024-02-08 PROCEDURE — G0299 HHS/HOSPICE OF RN EA 15 MIN: HCPCS

## 2024-02-08 NOTE — HOSPICE
Patient/Caregiver/Family/Facility findings/issues during SN visit:  n/a    Medication refills ordered this visit: n/a    Medications reconciled and all medications are available in the home this visit.  The following education was provided regarding medications, medication interactions, and look alike medications.  Response to teaching: caregiver verbalized understanding. Medications are effective at this time.      Supplies by type and quantity ordered this visit include: none needed    Consulted medical director/attending physician regarding: n/a    Instructed patient/family/caregiver on 24-hour hospice availability and phone number.    Plan for next visit:  follow up

## 2024-02-09 ENCOUNTER — HOME CARE VISIT (OUTPATIENT)
Age: 89
End: 2024-02-09
Payer: MEDICARE

## 2024-02-09 PROCEDURE — G0156 HHCP-SVS OF AIDE,EA 15 MIN: HCPCS

## 2024-02-12 ENCOUNTER — HOME CARE VISIT (OUTPATIENT)
Age: 89
End: 2024-02-12
Payer: MEDICARE

## 2024-02-12 PROCEDURE — G0299 HHS/HOSPICE OF RN EA 15 MIN: HCPCS

## 2024-02-13 ENCOUNTER — HOME CARE VISIT (OUTPATIENT)
Age: 89
End: 2024-02-13
Payer: MEDICARE

## 2024-02-13 VITALS
SYSTOLIC BLOOD PRESSURE: 108 MMHG | RESPIRATION RATE: 18 BRPM | HEART RATE: 61 BPM | TEMPERATURE: 98.6 F | DIASTOLIC BLOOD PRESSURE: 68 MMHG | OXYGEN SATURATION: 98 %

## 2024-02-13 PROCEDURE — G0156 HHCP-SVS OF AIDE,EA 15 MIN: HCPCS

## 2024-02-13 NOTE — HOSPICE
Patient/Caregiver/Family findings/issues during SN visit:  None identified    Medication refills ordered this visit: none needed    Medications reconciled and all medications are available in the home this visit.  Education was provided regarding medications, medication interactions, and look alike medications.    Response to teaching.  Family verbalizes understanding.   Medications are effective at this time.      Supplies by type and quantity ordered this visit include: barrier cream, chux and wipes order # [id number]    Consulted medical director/attending physician regarding:  Not at this time    Instructed patient/family/caregiver on 24-hour hospice availability and phone number.    Plan for next visit:  Continued EOL education and support

## 2024-02-15 ENCOUNTER — HOME CARE VISIT (OUTPATIENT)
Age: 89
End: 2024-02-15
Payer: MEDICARE

## 2024-02-15 VITALS
RESPIRATION RATE: 14 BRPM | HEART RATE: 66 BPM | TEMPERATURE: 98.1 F | SYSTOLIC BLOOD PRESSURE: 126 MMHG | OXYGEN SATURATION: 93 % | DIASTOLIC BLOOD PRESSURE: 58 MMHG

## 2024-02-15 PROCEDURE — G0299 HHS/HOSPICE OF RN EA 15 MIN: HCPCS

## 2024-02-15 NOTE — HOSPICE
Patient/Caregiver/Family/Facility findings/issues during SN visit: N/A    Medication refills ordered this visit: Refilled all Refill orders.    Medications reconciled and all medications are available in the home this visit. Medications are  effective at this time.      Supplies by type and quantity ordered this visit include: N/A    Consulted medical director/attending physician regarding: N/A    Instructed patient/family/caregiver on 24-hour hospice availability and phone number.    Plan for next visit:  Will follow up with Cm on Monday.  Arrived at patients home, Patient was sitting on her couch. Patient was not in any distress. and was breathing, and smiling. Talked over Patients, eating habits, and how she is starting to eat better. Patients vitals were done, all were wnl. Went over patients prescriptions.  and consolidated her meds, with aide. Patient denied any issues. Will Continue to Monitor patient.

## 2024-02-16 ENCOUNTER — HOME CARE VISIT (OUTPATIENT)
Age: 89
End: 2024-02-16
Payer: MEDICARE

## 2024-02-16 PROCEDURE — G0156 HHCP-SVS OF AIDE,EA 15 MIN: HCPCS

## 2024-02-16 PROCEDURE — G0155 HHCP-SVS OF CSW,EA 15 MIN: HCPCS

## 2024-02-19 ENCOUNTER — HOME CARE VISIT (OUTPATIENT)
Age: 89
End: 2024-02-19
Payer: MEDICARE

## 2024-02-19 VITALS
TEMPERATURE: 97.5 F | DIASTOLIC BLOOD PRESSURE: 63 MMHG | OXYGEN SATURATION: 96 % | SYSTOLIC BLOOD PRESSURE: 98 MMHG | RESPIRATION RATE: 16 BRPM | HEART RATE: 55 BPM

## 2024-02-19 PROCEDURE — G0299 HHS/HOSPICE OF RN EA 15 MIN: HCPCS

## 2024-02-19 ASSESSMENT — ENCOUNTER SYMPTOMS
BOWEL INCONTINENCE: 1
STOOL DESCRIPTION: SOFT FORMED

## 2024-02-19 NOTE — HOSPICE
Patient was in bed with her son right by her side. She was talkative today sharing about her great grand son. Family is stable and  will continue to follow. Relevant Problems   No relevant active problems       Anesthetic History   No history of anesthetic complications            Review of Systems / Medical History  Patient summary reviewed and pertinent labs reviewed    Pulmonary  Within defined limits                 Neuro/Psych   Within defined limits           Cardiovascular    Hypertension        Dysrhythmias (remains on Eliquis) : atrial fibrillation  CAD, cardiac stents and CABG    Exercise tolerance: <4 METS     GI/Hepatic/Renal     GERD           Endo/Other        Arthritis     Other Findings            Physical Exam    Airway  Mallampati: II  TM Distance: 4 - 6 cm  Neck ROM: normal range of motion   Mouth opening: Normal     Cardiovascular    Rhythm: irregular  Rate: normal         Dental         Pulmonary  Breath sounds clear to auscultation               Abdominal         Other Findings            Anesthetic Plan    ASA: 3  Anesthesia type: total IV anesthesia          Induction: Intravenous  Anesthetic plan and risks discussed with: Patient and Spouse

## 2024-02-20 ENCOUNTER — HOME CARE VISIT (OUTPATIENT)
Age: 89
End: 2024-02-20
Payer: MEDICARE

## 2024-02-20 PROCEDURE — G0156 HHCP-SVS OF AIDE,EA 15 MIN: HCPCS

## 2024-02-20 NOTE — HOSPICE
Patient/Caregiver/Family/Facility findings/issues during SN visit: No significant changes reported. Patient is alert, talkative and reports no pain or discomfort.     Medication refills ordered this visit: N/A    Medications reconciled and all medications are available in the home this visit. Medications are effective at this time.      Supplies by type and quantity ordered this visit include: N/A    Consulted medical director/attending physician regarding: N/A    Instructed patient/family/caregiver on 24-hour hospice availability and phone number.    Plan for next visit: routine visit with continued EOL education and support

## 2024-02-22 ENCOUNTER — HOME CARE VISIT (OUTPATIENT)
Age: 89
End: 2024-02-22
Payer: MEDICARE

## 2024-02-22 VITALS
HEART RATE: 55 BPM | OXYGEN SATURATION: 91 % | TEMPERATURE: 98.4 F | SYSTOLIC BLOOD PRESSURE: 100 MMHG | DIASTOLIC BLOOD PRESSURE: 54 MMHG | RESPIRATION RATE: 16 BRPM

## 2024-02-22 PROCEDURE — G0300 HHS/HOSPICE OF LPN EA 15 MIN: HCPCS

## 2024-02-22 ASSESSMENT — ENCOUNTER SYMPTOMS
BOWEL INCONTINENCE: 1
COUGH: 1
COUGH CHARACTERISTICS: PRODUCTIVE

## 2024-02-22 NOTE — HOSPICE
Patient/Caregiver/Family/Facility findings/issues during SN visit:  No new concerns.  Patient symptoms are well managed.  No c/o pain or discomfort.      Medication refills ordered this visit: Not needed this visit.    Medications reconciled and all medications are available in the home this visit.  The following education was provided regarding medications, medication interactions, and look alike medications: Medication side effects, dosages, purposes, frequencies.  Response to teaching: Verbalized understanding. Medications are effective at this time.      Supplies by type and quantity ordered this visit include: elisabet garcia-----Order Number : [id number]     Consulted medical director/attending physician regarding:     Instructed patient/family/caregiver on 24-hour hospice availability and phone number.    Plan for next visit:  Continue end of life education and support caregiver.

## 2024-02-23 ENCOUNTER — HOME CARE VISIT (OUTPATIENT)
Age: 89
End: 2024-02-23
Payer: MEDICARE

## 2024-02-23 PROCEDURE — G0156 HHCP-SVS OF AIDE,EA 15 MIN: HCPCS

## 2024-02-26 ENCOUNTER — HOME CARE VISIT (OUTPATIENT)
Age: 89
End: 2024-02-26
Payer: MEDICARE

## 2024-02-26 PROCEDURE — G0299 HHS/HOSPICE OF RN EA 15 MIN: HCPCS

## 2024-02-27 ENCOUNTER — HOME CARE VISIT (OUTPATIENT)
Age: 89
End: 2024-02-27
Payer: MEDICARE

## 2024-02-27 VITALS
DIASTOLIC BLOOD PRESSURE: 80 MMHG | OXYGEN SATURATION: 98 % | SYSTOLIC BLOOD PRESSURE: 147 MMHG | RESPIRATION RATE: 20 BRPM | TEMPERATURE: 97.9 F | HEART RATE: 78 BPM

## 2024-02-27 PROCEDURE — G0156 HHCP-SVS OF AIDE,EA 15 MIN: HCPCS

## 2024-02-27 NOTE — HOSPICE
Patient/Caregiver/Family/findings/issues during SN visit:  none identified    Medication refills ordered this visit: none needed    Medications reconciled and all medications are available in the home this visit.  Education was provided regarding medications, medication interactions, and look alike medications.  Response to teaching.  Family verbalizes understanding.   Medications are effective at this time.      Supplies by type and quantity ordered this visit include:  none needed    Consulted medical director/attending physician regarding:   Not at this time    Instructed patient/family/caregiver on 24-hour hospice availability and phone number.    Plan for next visit:  Continued EOL education and support

## 2024-02-29 ENCOUNTER — HOME CARE VISIT (OUTPATIENT)
Age: 89
End: 2024-02-29
Payer: MEDICARE

## 2024-02-29 VITALS
TEMPERATURE: 97.7 F | DIASTOLIC BLOOD PRESSURE: 80 MMHG | SYSTOLIC BLOOD PRESSURE: 115 MMHG | RESPIRATION RATE: 16 BRPM | HEART RATE: 80 BPM | OXYGEN SATURATION: 98 %

## 2024-02-29 PROCEDURE — G0299 HHS/HOSPICE OF RN EA 15 MIN: HCPCS

## 2024-02-29 ASSESSMENT — ENCOUNTER SYMPTOMS: BOWEL INCONTINENCE: 1

## 2024-02-29 NOTE — HOSPICE
Patient/Caregiver/Family findings/issues during SN visit:  Increased sleeping    Medication refills ordered this visit:  None needed    Medications reconciled and all medications are available in the home this visit.  Education was provided regarding medications, medication interactions, and look alike medications.   Response to teaching.  Daughter in law verbalized understanding.   Medications are effective at this time.  Using morphine prior to any moving in bed.      Supplies by type and quantity ordered this visit include:  None needed    Consulted medical director/attending physician regarding: Not at this time    Instructed patient/family/caregiver on 24-hour hospice availability and phone number.    Plan for next visit:  Continue to monitor for decline, eol education provided and provided support

## 2024-03-01 ENCOUNTER — HOME CARE VISIT (OUTPATIENT)
Age: 89
End: 2024-03-01
Payer: MEDICARE

## 2024-03-01 PROCEDURE — G0156 HHCP-SVS OF AIDE,EA 15 MIN: HCPCS

## 2024-03-04 ENCOUNTER — HOME CARE VISIT (OUTPATIENT)
Age: 89
End: 2024-03-04
Payer: MEDICARE

## 2024-03-04 VITALS
RESPIRATION RATE: 16 BRPM | HEART RATE: 46 BPM | OXYGEN SATURATION: 99 % | TEMPERATURE: 98.7 F | SYSTOLIC BLOOD PRESSURE: 136 MMHG | DIASTOLIC BLOOD PRESSURE: 68 MMHG

## 2024-03-04 PROCEDURE — G0299 HHS/HOSPICE OF RN EA 15 MIN: HCPCS

## 2024-03-04 NOTE — HOSPICE
Patient/Caregiver/Family findings/issues during SN visit:  constipation.  MOM 30ml daily until BM.  Patient refusing ducolax supp.    Medication refills ordered this visit:   none needed    Medications reconciled and all medications are available in the home this visit.  Education was provided regarding medications, medication interactions, and look alike medications.    Response to teaching.  Family verbalized understanding.   Medications are effective at this time.      Supplies by type and quantity ordered this visit include: barrier cream, chux, wipes and large gloves.  order #[id number]    Consulted medical director/attending physician regarding: Not at this time    Instructed patient/family/caregiver on 24-hour hospice availability and phone number.    Plan for next visit:  Continued EOL education and support

## 2024-03-05 ENCOUNTER — HOME CARE VISIT (OUTPATIENT)
Age: 89
End: 2024-03-05
Payer: MEDICARE

## 2024-03-05 PROCEDURE — G0156 HHCP-SVS OF AIDE,EA 15 MIN: HCPCS

## 2024-03-07 ENCOUNTER — HOME CARE VISIT (OUTPATIENT)
Age: 89
End: 2024-03-07
Payer: MEDICARE

## 2024-03-07 PROCEDURE — G0300 HHS/HOSPICE OF LPN EA 15 MIN: HCPCS

## 2024-03-08 ENCOUNTER — HOME CARE VISIT (OUTPATIENT)
Age: 89
End: 2024-03-08
Payer: MEDICARE

## 2024-03-08 VITALS
TEMPERATURE: 98.3 F | HEART RATE: 56 BPM | OXYGEN SATURATION: 93 % | RESPIRATION RATE: 16 BRPM | DIASTOLIC BLOOD PRESSURE: 62 MMHG | SYSTOLIC BLOOD PRESSURE: 112 MMHG

## 2024-03-08 PROCEDURE — G0156 HHCP-SVS OF AIDE,EA 15 MIN: HCPCS

## 2024-03-11 ENCOUNTER — HOME CARE VISIT (OUTPATIENT)
Age: 89
End: 2024-03-11
Payer: MEDICARE

## 2024-03-11 VITALS
RESPIRATION RATE: 16 BRPM | HEART RATE: 77 BPM | OXYGEN SATURATION: 97 % | TEMPERATURE: 98.1 F | SYSTOLIC BLOOD PRESSURE: 108 MMHG | DIASTOLIC BLOOD PRESSURE: 57 MMHG

## 2024-03-11 PROCEDURE — G0299 HHS/HOSPICE OF RN EA 15 MIN: HCPCS

## 2024-03-12 ENCOUNTER — HOME CARE VISIT (OUTPATIENT)
Age: 89
End: 2024-03-12
Payer: MEDICARE

## 2024-03-12 PROCEDURE — G0156 HHCP-SVS OF AIDE,EA 15 MIN: HCPCS

## 2024-03-13 ENCOUNTER — HOME CARE VISIT (OUTPATIENT)
Age: 89
End: 2024-03-13
Payer: MEDICARE

## 2024-03-13 VITALS
SYSTOLIC BLOOD PRESSURE: 105 MMHG | HEART RATE: 57 BPM | TEMPERATURE: 97.4 F | OXYGEN SATURATION: 96 % | DIASTOLIC BLOOD PRESSURE: 55 MMHG | RESPIRATION RATE: 17 BRPM

## 2024-03-13 PROCEDURE — G0299 HHS/HOSPICE OF RN EA 15 MIN: HCPCS

## 2024-03-13 ASSESSMENT — ENCOUNTER SYMPTOMS
PAIN LOCATION - PAIN QUALITY: SORE
SKIN LESIONS: 1
SKIN LESIONS: 1

## 2024-03-13 NOTE — HOSPICE
Patient/Caregiver/Family/Facility findings/issues during SN visit:  prolonged sleep    Medication refills ordered this visit: none needed    Medications reconciled and all medications are available in the home this visit.  The following education was provided regarding medications, medication interactions, and look alike medications.  Response to teaching: caregiver verbalized understanding. Medications are effective at this time.      Supplies by type and quantity ordered this visit include: none needed    Consulted medical director/attending physician regarding: n/a    Instructed patient/family/caregiver on 24-hour hospice availability and phone number.    Plan for next visit:  follow up

## 2024-03-14 NOTE — HOSPICE
Patient/Caregiver/Family/Facility findings/issues during SN visit:  n/a    Medication refills ordered this visit: n/a    Medications reconciled and all medications are available in the home this visit.  The following education was provided regarding medications, medication interactions, and look alike medications.  Response to teaching: caregiver verbalized understanding. Medications are effective at this time.      Supplies by type and quantity ordered this visit include: none needed    Consulted medical director/attending physician regarding: n/a    Instructed patient/family/caregiver on 24-hour hospice availability and phone number.    Plan for next visit:  follow up    pt is eating 1/2 a cup per meal and sleeping most of the day. 1/10 pain left side of thoracic cavity; declined medication. repositioning helped withbpain. no further complaints.

## 2024-03-15 ENCOUNTER — HOME CARE VISIT (OUTPATIENT)
Age: 89
End: 2024-03-15
Payer: MEDICARE

## 2024-03-15 PROCEDURE — G0156 HHCP-SVS OF AIDE,EA 15 MIN: HCPCS

## 2024-03-18 ENCOUNTER — HOME CARE VISIT (OUTPATIENT)
Age: 89
End: 2024-03-18
Payer: MEDICARE

## 2024-03-18 VITALS
OXYGEN SATURATION: 100 % | DIASTOLIC BLOOD PRESSURE: 57 MMHG | TEMPERATURE: 98.3 F | RESPIRATION RATE: 18 BRPM | HEART RATE: 57 BPM | SYSTOLIC BLOOD PRESSURE: 98 MMHG

## 2024-03-18 PROCEDURE — G0300 HHS/HOSPICE OF LPN EA 15 MIN: HCPCS

## 2024-03-18 ASSESSMENT — ENCOUNTER SYMPTOMS: SKIN LESIONS: 1

## 2024-03-18 NOTE — HOSPICE
Patient/Caregiver/Family/Facility findings/issues during SN visit:  Pt presents lying in bed with her eyes closed.  Left hand is slightly swollen.  Pt cant remember hitting it.  No open areas or redness noted.  Pt advised to elevate on a pillow.  No c/o pain.  Vitals within normal limits.  Symptoms managed well at this time.    Medication refills ordered this visit: Not needed this visit    Medications reconciled and all medications are available in the home this visit.  The following education was provided regarding medications, medication interactions, and look alike medications: Medication side effects, dosages, purposes, frequencies.  Response to teaching: Verbalized understanding. Medications are effective at this time.      Supplies by type and quantity ordered this visit include: Not needed this visit    Consulted medical director/attending physician regarding: Not needed    Instructed patient/family/caregiver on 24-hour hospice availability and phone number.    Plan for next visit:  Continue end of life education and support caregiver.

## 2024-03-19 ENCOUNTER — HOME CARE VISIT (OUTPATIENT)
Age: 89
End: 2024-03-19
Payer: MEDICARE

## 2024-03-19 PROCEDURE — G0156 HHCP-SVS OF AIDE,EA 15 MIN: HCPCS

## 2024-03-21 ENCOUNTER — HOME CARE VISIT (OUTPATIENT)
Age: 89
End: 2024-03-21
Payer: MEDICARE

## 2024-03-21 VITALS
TEMPERATURE: 98.4 F | DIASTOLIC BLOOD PRESSURE: 50 MMHG | HEART RATE: 48 BPM | RESPIRATION RATE: 16 BRPM | SYSTOLIC BLOOD PRESSURE: 110 MMHG | OXYGEN SATURATION: 91 %

## 2024-03-21 PROCEDURE — G0300 HHS/HOSPICE OF LPN EA 15 MIN: HCPCS

## 2024-03-21 NOTE — HOSPICE
Patient/Caregiver/Family/Facility findings/issues during SN visit:  No new concerns.  Patient symptoms are managed well and family is comfortable medicating.  Patient denies pain.  Appetite has decreased according to son.  Patient is lying with eyes closed talking to GOD asking him to take her.    Medication refills ordered this visit: Haldol Confirmation # [id number]    Medications reconciled and all medications are available in the home this visit.  The following education was provided regarding medications, medication interactions, and look alike medications: Medication side effects, dosages, purposes, frequencies.  Response to teaching: Verbalized understanding. Medications are effective at this time.      Supplies by type and quantity ordered this visit include: pads, diapers, mouth moisturizer----Order Number : [id number]    Consulted medical director/attending physician regarding: Not needed    Instructed patient/family/caregiver on 24-hour hospice availability and phone number.    Plan for next visit:  Continue end of life education and support caregiver.

## 2024-03-22 ENCOUNTER — HOME CARE VISIT (OUTPATIENT)
Age: 89
End: 2024-03-22
Payer: MEDICARE

## 2024-03-22 PROCEDURE — G0156 HHCP-SVS OF AIDE,EA 15 MIN: HCPCS

## 2024-03-25 ENCOUNTER — HOME CARE VISIT (OUTPATIENT)
Age: 89
End: 2024-03-25
Payer: MEDICARE

## 2024-03-25 PROCEDURE — G0299 HHS/HOSPICE OF RN EA 15 MIN: HCPCS

## 2024-03-25 NOTE — HOSPICE
Patient was in bed being taken care of by her  today. Once finished she was tireed and went to sleep. Patients son Mick and his wife Carlotta are her caregivers. I have been talking to them both about self-care.  will continue to encourage them to think about self-care.

## 2024-03-26 ENCOUNTER — HOME CARE VISIT (OUTPATIENT)
Age: 89
End: 2024-03-26
Payer: MEDICARE

## 2024-03-26 VITALS
SYSTOLIC BLOOD PRESSURE: 146 MMHG | RESPIRATION RATE: 18 BRPM | OXYGEN SATURATION: 98 % | DIASTOLIC BLOOD PRESSURE: 78 MMHG | HEART RATE: 72 BPM | TEMPERATURE: 98.2 F

## 2024-03-26 PROCEDURE — G0156 HHCP-SVS OF AIDE,EA 15 MIN: HCPCS

## 2024-03-26 ASSESSMENT — ENCOUNTER SYMPTOMS: CONSTIPATION: 1

## 2024-03-26 NOTE — HOSPICE
Patient/Caregiver/Family findings/issues during SN visit:  None identified    Medication refills ordered this visit: MOM and haldol refilled confirmation#[id number]    Medications reconciled and all medications are available in the home this visit.  Education was provided regarding medications, medication interactions, and look alike medications.    Response to teaching.  Son and DIL verbalized understanding.   Medications are effective at this time.      Supplies by type and quantity ordered this visit include: chux, barrier cream, and large gloves ordered    Consulted medical director/attending physician regarding: Not at this time    Instructed patient/family/caregiver on 24-hour hospice availability and phone number.    Plan for next visit:  Continued EOL education and support

## 2024-03-28 ENCOUNTER — HOME CARE VISIT (OUTPATIENT)
Age: 89
End: 2024-03-28
Payer: MEDICARE

## 2024-03-28 VITALS
HEART RATE: 72 BPM | DIASTOLIC BLOOD PRESSURE: 57 MMHG | TEMPERATURE: 98.8 F | OXYGEN SATURATION: 95 % | RESPIRATION RATE: 16 BRPM | SYSTOLIC BLOOD PRESSURE: 91 MMHG

## 2024-03-28 PROCEDURE — G0299 HHS/HOSPICE OF RN EA 15 MIN: HCPCS

## 2024-03-28 NOTE — HOSPICE
Patient/Caregiver/Family/Facility findings/issues during SN visit:  n/a    Medication refills ordered this visit: n/a    Medications reconciled and all medications are available in the home this visit.  The following education was provided regarding medications, medication interactions, and look alike medications.  Response to teaching: caregiver verbalized understanding. Medications are effective at this time.      Supplies by type and quantity ordered this visit include: none needed    Consulted medical director/attending physician regarding: n/a    Instructed patient/family/caregiver on 24-hour hospice availability and phone number.    Plan for next visit:  follow up     Pt is eating intermittently (every other day). Pt is lethargic and sleeping most of the day. During assessment pt was communicating with her closed eyes. She is more confused and has hypotension.

## 2024-04-01 ENCOUNTER — HOME CARE VISIT (OUTPATIENT)
Age: 89
End: 2024-04-01
Payer: MEDICARE

## 2024-04-01 PROCEDURE — G0299 HHS/HOSPICE OF RN EA 15 MIN: HCPCS

## 2024-04-02 ENCOUNTER — HOME CARE VISIT (OUTPATIENT)
Age: 89
End: 2024-04-02
Payer: MEDICARE

## 2024-04-02 VITALS
RESPIRATION RATE: 18 BRPM | SYSTOLIC BLOOD PRESSURE: 110 MMHG | HEART RATE: 72 BPM | DIASTOLIC BLOOD PRESSURE: 68 MMHG | TEMPERATURE: 98.2 F

## 2024-04-02 PROCEDURE — G0156 HHCP-SVS OF AIDE,EA 15 MIN: HCPCS

## 2024-04-02 ASSESSMENT — ENCOUNTER SYMPTOMS: BOWEL INCONTINENCE: 1

## 2024-04-04 ENCOUNTER — HOME CARE VISIT (OUTPATIENT)
Age: 89
End: 2024-04-04
Payer: MEDICARE

## 2024-04-04 PROCEDURE — G0299 HHS/HOSPICE OF RN EA 15 MIN: HCPCS

## 2024-04-05 ENCOUNTER — HOME CARE VISIT (OUTPATIENT)
Age: 89
End: 2024-04-05
Payer: MEDICARE

## 2024-04-05 VITALS
SYSTOLIC BLOOD PRESSURE: 106 MMHG | DIASTOLIC BLOOD PRESSURE: 68 MMHG | HEART RATE: 85 BPM | TEMPERATURE: 99.5 F | OXYGEN SATURATION: 99 % | RESPIRATION RATE: 18 BRPM

## 2024-04-05 PROCEDURE — G0156 HHCP-SVS OF AIDE,EA 15 MIN: HCPCS

## 2024-04-05 ASSESSMENT — ENCOUNTER SYMPTOMS: BOWEL INCONTINENCE: 1

## 2024-04-05 NOTE — HOSPICE
Patient/Caregiver/Family findings/issues during SN visit:  None identified    Medication refills ordered this visit: none needed    Medications reconciled and all medications are available in the home this visit.  Education was provided regarding medications, medication interactions, and look alike medications.  Response to teaching.  Son and DIL verbalized understanding.   Medications are effective at this time.      Supplies by type and quantity ordered this visit include: none needed    Consulted medical director/attending physician regarding:  not at this time    Instructed patient/family/caregiver on 24-hour hospice availability and phone number.    Plan for next visit:  Continued EOL education and support

## 2024-04-08 ENCOUNTER — HOME CARE VISIT (OUTPATIENT)
Age: 89
End: 2024-04-08
Payer: MEDICARE

## 2024-04-08 PROCEDURE — G0300 HHS/HOSPICE OF LPN EA 15 MIN: HCPCS

## 2024-04-09 ENCOUNTER — HOME CARE VISIT (OUTPATIENT)
Age: 89
End: 2024-04-09
Payer: MEDICARE

## 2024-04-09 VITALS
OXYGEN SATURATION: 93 % | RESPIRATION RATE: 16 BRPM | SYSTOLIC BLOOD PRESSURE: 91 MMHG | TEMPERATURE: 99 F | DIASTOLIC BLOOD PRESSURE: 56 MMHG | HEART RATE: 69 BPM

## 2024-04-09 PROCEDURE — G0299 HHS/HOSPICE OF RN EA 15 MIN: HCPCS

## 2024-04-09 PROCEDURE — G0156 HHCP-SVS OF AIDE,EA 15 MIN: HCPCS

## 2024-04-09 ASSESSMENT — ENCOUNTER SYMPTOMS: BOWEL INCONTINENCE: 1

## 2024-04-09 NOTE — HOSPICE
MSW places call for routine visit and support needs and speaks to son, as pt is imminent. Son declines needing a face to face visit and reports that he and family are coping well. Son admits that pt is declining and understands to call hospice with any needs. Son reports that pt is comfortable today.

## 2024-04-09 NOTE — HOSPICE
Patient/Caregiver/Family/Facility findings/issues during SN visit:  Pt presents lying in bed with eyes closed.  Minimally responsive.  Daily visits initiated for symptom management and family support.    Medication refills ordered this visit: Not needed this visit    Medications reconciled and all medications are available in the home this visit.  The following education was provided regarding medications, medication interactions, and look alike medications: Medication side effects, dosages, purposes, frequencies.  Response to teaching: Verbalized understanding. Medications are effective at this time.      Supplies by type and quantity ordered this visit include: not needed    Consulted medical director/attending physician regarding: Not needed    Instructed patient/family/caregiver on 24-hour hospice availability and phone number.    Plan for next visit:  Continue end of life education and support caregiver.

## 2024-04-10 ENCOUNTER — HOME CARE VISIT (OUTPATIENT)
Age: 89
End: 2024-04-10
Payer: MEDICARE

## 2024-04-10 VITALS
TEMPERATURE: 97.7 F | DIASTOLIC BLOOD PRESSURE: 54 MMHG | OXYGEN SATURATION: 97 % | RESPIRATION RATE: 14 BRPM | HEART RATE: 66 BPM | SYSTOLIC BLOOD PRESSURE: 116 MMHG

## 2024-04-10 PROCEDURE — G0299 HHS/HOSPICE OF RN EA 15 MIN: HCPCS

## 2024-04-10 ASSESSMENT — ENCOUNTER SYMPTOMS: BOWEL INCONTINENCE: 1

## 2024-04-10 NOTE — HOSPICE
Patient/Caregiver/Family findings/issues during SN visit:  None identified    Medication refills ordered this visit:  None needed    Medications reconciled and all medications are available in the home this visit.  Education was provided regarding medications, medication interactions, and look alike medications.   Response to teaching.  Medications are effective/not effective at this time.      Supplies by type and quantity ordered this visit include: None needed    Consulted medical director/attending physician regarding:  not at this time    Instructed patient/family/caregiver on 24-hour hospice availability and phone number.    Plan for next visit:  Continued EOL education and support.  Patient remains imminent and on daily visits.

## 2024-04-11 ENCOUNTER — HOME CARE VISIT (OUTPATIENT)
Age: 89
End: 2024-04-11
Payer: MEDICARE

## 2024-04-11 VITALS
RESPIRATION RATE: 21 BRPM | TEMPERATURE: 98.7 F | DIASTOLIC BLOOD PRESSURE: 80 MMHG | SYSTOLIC BLOOD PRESSURE: 100 MMHG | OXYGEN SATURATION: 96 % | HEART RATE: 92 BPM

## 2024-04-11 VITALS
OXYGEN SATURATION: 97 % | DIASTOLIC BLOOD PRESSURE: 62 MMHG | TEMPERATURE: 98.6 F | RESPIRATION RATE: 14 BRPM | HEART RATE: 87 BPM | SYSTOLIC BLOOD PRESSURE: 97 MMHG

## 2024-04-11 PROCEDURE — G0299 HHS/HOSPICE OF RN EA 15 MIN: HCPCS

## 2024-04-11 ASSESSMENT — ENCOUNTER SYMPTOMS: BOWEL INCONTINENCE: 1

## 2024-04-11 NOTE — HOSPICE
Patient/Caregiver/Family findings/issues during SN visit:  None identified    Medication refills ordered this visit: None needed    Medications reconciled and all medications are available in the home this visit.  Education was provided regarding medications, medication interactions, and look alike medications.    Response to teaching.  Family verbalizes understanding.   Medications are effective at this time.      Supplies by type and quantity ordered this visit include:  none needed    Consulted medical director/attending physician regarding: Not at this time    Instructed patient/family/caregiver on 24-hour hospice availability and phone number.    Plan for next visit:  Unresponsive.  Family educating as needed in regards to medicating.  Continued EOL education and support

## 2024-04-11 NOTE — HOSPICE
Patient/Caregiver/Family/Facility findings/issues during SN visit:  pt is sleeping most of the day, hasn't had a bowel movement in over a week, as well as not eating for a week and taking minumal sips    Medication refills ordered this visit: n/a    Medications reconciled and all medications are available in the home this visit.  The following education was provided regarding medications, medication interactions, and look alike medications.  Response to teaching: caregiver vebalized understanding. Medications are effective at this time.      Supplies by type and quantity ordered this visit include: none needed    Consulted medical director/attending physician regarding: n/a    Instructed patient/family/caregiver on 24-hour hospice availability and phone number.    Plan for next visit:  follow up daily visits

## 2024-04-12 ENCOUNTER — HOME CARE VISIT (OUTPATIENT)
Age: 89
End: 2024-04-12
Payer: MEDICARE

## 2024-04-12 VITALS
DIASTOLIC BLOOD PRESSURE: 81 MMHG | HEART RATE: 84 BPM | RESPIRATION RATE: 16 BRPM | TEMPERATURE: 98.9 F | OXYGEN SATURATION: 89 % | SYSTOLIC BLOOD PRESSURE: 124 MMHG

## 2024-04-12 PROCEDURE — G0300 HHS/HOSPICE OF LPN EA 15 MIN: HCPCS

## 2024-04-12 PROCEDURE — G0156 HHCP-SVS OF AIDE,EA 15 MIN: HCPCS

## 2024-04-12 ASSESSMENT — ENCOUNTER SYMPTOMS: BOWEL INCONTINENCE: 1

## 2024-04-12 NOTE — HOSPICE
Patient/Caregiver/Family/Facility findings/issues during SN visit: Patient is unresponsive to tactile stimuli.   Patient hasnt eaten in over a week.  Decreased urine production and no BM in over a week.  Symptoms managed well at this time.  No signs of pain or discomfort.  Continue daily visits for symptom management and family support.    Medication refills ordered this visit: Not needed     Medications reconciled and all medications are available in the home this visit.  The following education was provided regarding medications, medication interactions, and look alike medications: Medication side effects, dosages, purposes, frequencies.  Response to teaching: Verbalized understanding. Medications are effective at this time.      Supplies by type and quantity ordered this visit include: Not needed    Consulted medical director/attending physician regarding: Not needed    Instructed patient/family/caregiver on 24-hour hospice availability and phone number.    Plan for next visit:  Continue end of life education and support caregiver.

## 2024-04-13 ENCOUNTER — HOME CARE VISIT (OUTPATIENT)
Age: 89
End: 2024-04-13
Payer: MEDICARE

## 2024-04-13 PROCEDURE — G0299 HHS/HOSPICE OF RN EA 15 MIN: HCPCS

## 2024-04-13 ASSESSMENT — ENCOUNTER SYMPTOMS: BOWEL INCONTINENCE: 1

## 2024-04-13 NOTE — HOSPICE
Medication refills ordered this visit: No refills needed. Patient has alll comfort medications in the home.    Medications reconciled and all medications are available in the home this visit. Education was provided regarding medications, medication interactions, and look alike medications.     Response to teaching. Campbell verbalized an understanding.    Supplies by type and quantity ordered this visit include: None needed    Consulted medical director/attending physician regarding: No contact needed during this visit    Instructed patient/family/caregiver on 24-hour hospice availability and phone number.    Plan for next visit: Monitor imminence. Continue end-of-life education. Monitor supplies and medications.    Patient is unresponsive throughout this visit. Patient is not eating or drinking. Brief changed by this nurse. Heels floated. Patient appears comfortable. No needs at this time.

## 2024-04-14 ENCOUNTER — HOME CARE VISIT (OUTPATIENT)
Age: 89
End: 2024-04-14
Payer: MEDICARE

## 2024-04-14 VITALS
SYSTOLIC BLOOD PRESSURE: 125 MMHG | TEMPERATURE: 97.6 F | OXYGEN SATURATION: 96 % | DIASTOLIC BLOOD PRESSURE: 71 MMHG | HEART RATE: 100 BPM

## 2024-04-14 PROCEDURE — G0299 HHS/HOSPICE OF RN EA 15 MIN: HCPCS

## 2024-04-16 NOTE — HOSPICE
Postmortem care provided to patient.  Caregiver actively and willingly participated in care.  Patient and family's postmortem Islam and cultural wishes maintained. The following were removed Eyes gently closed. Patientwas turned on her side on arrival including her head turned to the left. Unable to reposition patient's head post-mortem.  HOB raised to 30 degrees.      Encouraged caregiver and family to spend time with  patient.  Provided emotional support.      Family is appropriately grieving.    [facility] picked up remains.
